# Patient Record
Sex: MALE | Race: WHITE | NOT HISPANIC OR LATINO | Employment: FULL TIME | URBAN - METROPOLITAN AREA
[De-identification: names, ages, dates, MRNs, and addresses within clinical notes are randomized per-mention and may not be internally consistent; named-entity substitution may affect disease eponyms.]

---

## 2019-03-29 ENCOUNTER — HOSPITAL ENCOUNTER (OUTPATIENT)
Dept: RADIOLOGY | Facility: MEDICAL CENTER | Age: 44
End: 2019-03-29
Attending: PHYSICIAN ASSISTANT
Payer: COMMERCIAL

## 2019-03-29 ENCOUNTER — HOSPITAL ENCOUNTER (EMERGENCY)
Facility: MEDICAL CENTER | Age: 44
End: 2019-03-29
Attending: EMERGENCY MEDICINE
Payer: COMMERCIAL

## 2019-03-29 VITALS
SYSTOLIC BLOOD PRESSURE: 169 MMHG | WEIGHT: 245.59 LBS | HEART RATE: 69 BPM | DIASTOLIC BLOOD PRESSURE: 91 MMHG | TEMPERATURE: 98.1 F | OXYGEN SATURATION: 98 % | HEIGHT: 74 IN | BODY MASS INDEX: 31.52 KG/M2 | RESPIRATION RATE: 20 BRPM

## 2019-03-29 DIAGNOSIS — I82.492 ACUTE DEEP VEIN THROMBOSIS (DVT) OF OTHER SPECIFIED VEIN OF LEFT LOWER EXTREMITY (HCC): ICD-10-CM

## 2019-03-29 DIAGNOSIS — M79.605 LEFT LEG PAIN: ICD-10-CM

## 2019-03-29 PROCEDURE — 93971 EXTREMITY STUDY: CPT | Mod: LT

## 2019-03-29 PROCEDURE — 99284 EMERGENCY DEPT VISIT MOD MDM: CPT

## 2019-03-29 PROCEDURE — 700102 HCHG RX REV CODE 250 W/ 637 OVERRIDE(OP): Performed by: EMERGENCY MEDICINE

## 2019-03-29 PROCEDURE — A9270 NON-COVERED ITEM OR SERVICE: HCPCS | Performed by: EMERGENCY MEDICINE

## 2019-03-29 RX ORDER — VERAPAMIL HYDROCHLORIDE 240 MG/1
240 TABLET, FILM COATED, EXTENDED RELEASE ORAL DAILY
COMMUNITY

## 2019-03-29 RX ADMIN — RIVAROXABAN 15 MG: 15 TABLET, FILM COATED ORAL at 21:23

## 2019-03-30 NOTE — DISCHARGE INSTRUCTIONS
He was seen in the emergency department for a blood clot in your leg.  This is concerning for a deep venous thrombosis.  You are being started on anticoagulation for this.  Please apply hot compresses to the area of the swelling to help your body resorb it.    You are being started on a blood thinner.  You must take this with meals for to be effective.  You will take it twice a day for 21 days, then will be transitioned onto a higher dose that is daily.  Please follow-up with your primary care provider for this ongoing prescription.  You will likely be on this medication for many months.  Please take care to avoid significant accidents while you are taking this medication.    Please return to the emergency department or seek medical attention if you develop:  Any significant head trauma, dark tarry stools, bloody stools, blood in the urine, chest pain, shortness of breath, feeling like you are going to pass out, any other new or concerning findings

## 2019-03-30 NOTE — ED PROVIDER NOTES
"ED Provider Note      Means of Arrival: Private vehicle  History obtained from: Patient, medical record      CHIEF COMPLAINT  Chief Complaint   Patient presents with   • Sent by MD KATZ  Colt Segovia is a 43 y.o. male who presents with blood clot in his left leg.  The patient noted this morning that he had swelling and redness on the inside of his left thigh.  He has a history of varicose veins.  He denies any chest pain, shortness of breath, prior DVT, recent long travels, recent surgeries, abnormal bleeding.  An ultrasound was done, which demonstrated a clot in the greater saphenous vein to the junction.     REVIEW OF SYSTEMS  CONSTITUTIONAL:  No fever.  CARDIOVASCULAR:  No chest discomfort.  RESPIRATORY:  No pleuritic chest pain.  GASTROINTESTINAL:  No abdominal pain.  GENITOURINARY:   No dysuria.  MUSCULOSKELETAL:  No arthralgia.  SKIN:  No rash or suspicious lesions.  NEUROLOGIC:   No headache.  ENDOCRINE:  No facial edema.  HEMATOLOGIC:  No abnormal bleeding.       See HPI for further details.   All other systems are negative.     PAST MEDICAL HISTORY  Past Medical History:   Diagnosis Date   • Ventricular tachycardia (HCC)        FAMILY HISTORY  History reviewed. No pertinent family history.    SOCIAL HISTORY   reports that he has never smoked. His smokeless tobacco use includes Chew. He reports that he does not drink alcohol or use drugs.    SURGICAL HISTORY  History reviewed. No pertinent surgical history.    CURRENT MEDICATIONS  Home Medications     Reviewed by Bautista Jewell R.N. (Registered Nurse) on 03/29/19 at 1834  Med List Status: Partial   Medication Last Dose Status   verapamil ER (CALAN-SR) 240 MG Tab CR  Active                ALLERGIES  No Known Allergies    PHYSICAL EXAM  VITAL SIGNS: BP (!) 187/122   Pulse 69   Temp 36.9 °C (98.5 °F) (Temporal)   Resp 18   Ht 1.88 m (6' 2\")   Wt 111.4 kg (245 lb 9.5 oz)   SpO2 96%   BMI 31.53 kg/m²    Gen: Alert  HENT: ATNC  Eyes: Normal " conjunctiva  Neck: trachea midline  Resp: no respiratory distress, clear to auscultation bilaterally  CV: No JVD, regular rate and rhythm, no murmurs, rubs, gallops  Abd: non-distended  Ext: No deformities.  Palpable thrill along medial aspect of thigh with surrounding erythema and warmth.  No significant tenderness to palpation.  Normal pulses, no distal swelling.  Psych: normal mood  Neuro: speech fluent           COURSE & MEDICAL DECISION MAKING  Pertinent Labs & Imaging studies reviewed. (See chart for details)    Patient has clotted greater saphenous vein, however this extends to the junction concerning for possible DVT.  He will be started on anticoagulant agent for this.  He has no signs or symptoms to suggest pulmonary embolism, specifically no tachycardia, hypoxia, chest pain, shortness of breath, lightheadedness.  Patient will be started on Xarelto.  The risks and benefits of this medication were discussed with the patient expressed understanding.  He was provided the opportunity ask questions and given return precautions.  No phlegmasia cerulea dolens.    FINAL IMPRESSION  1. Acute deep vein thrombosis (DVT) of other specified vein of left lower extremity (HCC)

## 2019-03-30 NOTE — ED TRIAGE NOTES
Pt bib self with after being sent by his PCP. Pt underwent an US of his left leg earlier today at which point a clot was found in his upper thigh. Pt was advised to be seen in the ED for further evaluation

## 2023-01-24 ENCOUNTER — APPOINTMENT (OUTPATIENT)
Dept: URGENT CARE | Facility: CLINIC | Age: 48
End: 2023-01-24

## 2023-01-24 ENCOUNTER — OFFICE VISIT (OUTPATIENT)
Dept: URGENT CARE | Facility: CLINIC | Age: 48
End: 2023-01-24
Payer: COMMERCIAL

## 2023-01-24 VITALS
BODY MASS INDEX: 29.52 KG/M2 | HEIGHT: 74 IN | RESPIRATION RATE: 16 BRPM | SYSTOLIC BLOOD PRESSURE: 160 MMHG | WEIGHT: 230 LBS | HEART RATE: 96 BPM | TEMPERATURE: 98.9 F | OXYGEN SATURATION: 97 % | DIASTOLIC BLOOD PRESSURE: 100 MMHG

## 2023-01-24 DIAGNOSIS — J06.9 VIRAL URI WITH COUGH: ICD-10-CM

## 2023-01-24 DIAGNOSIS — U07.1 COVID-19: ICD-10-CM

## 2023-01-24 PROCEDURE — 99203 OFFICE O/P NEW LOW 30 MIN: CPT | Performed by: NURSE PRACTITIONER

## 2023-01-24 RX ORDER — ESCITALOPRAM OXALATE 20 MG/1
TABLET ORAL
COMMUNITY
Start: 2023-01-03

## 2023-01-24 RX ORDER — LISINOPRIL 20 MG/1
TABLET ORAL
COMMUNITY
Start: 2022-12-28

## 2023-01-24 ASSESSMENT — ENCOUNTER SYMPTOMS
COUGH: 1
SHORTNESS OF BREATH: 0
NAUSEA: 0
WHEEZING: 0
ABDOMINAL PAIN: 0
CARDIOVASCULAR NEGATIVE: 1
FEVER: 0
VOMITING: 1

## 2023-01-24 ASSESSMENT — VISUAL ACUITY: OU: 1

## 2023-01-24 NOTE — PROGRESS NOTES
"Subjective:     Colt Segovia is a 47 y.o. male who presents for Emesis (\"Tested positive for Covid one week ago. I start coughing so hard that I vomit.\" )       URI   This is a new problem. The problem has been gradually worsening. Associated symptoms include coughing (Dry) and vomiting. Pertinent negatives include no abdominal pain, chest pain, nausea or wheezing.     Last Monday, patient tested positive for COVID.  Reports symptoms started last Saturday.  Felt fatigued.  Improving over all, but continues to have intermittent dry coughing fits.  Would sometimes cause him to vomit.  Reports he is only here because his wife was concerned about his bad cough last night.  Tx: ice water, Advil PM.    Review of Systems   Constitutional:  Positive for malaise/fatigue. Negative for fever.   Respiratory:  Positive for cough (Dry). Negative for shortness of breath and wheezing.    Cardiovascular: Negative.  Negative for chest pain.   Gastrointestinal:  Positive for vomiting. Negative for abdominal pain and nausea.   All other systems reviewed and are negative.    Refer to HPI for additional details.    During this visit, appropriate PPE was worn, hand hygiene was performed, and the patient and any visitors were masked.    PMH:  has a past medical history of Ventricular tachycardia.    MEDS:   Current Outpatient Medications:     escitalopram (LEXAPRO) 20 MG tablet, TAKE 1 TABLET BY MOUTH EVERY DAY-NEEDS APPT FOR REFILLS, Disp: , Rfl:     lisinopril (PRINIVIL) 20 MG Tab, TAKE 1 TABLET BY MOUTH EVERY DAY-NEEDS APPT FOR REFILLS, Disp: , Rfl:     verapamil ER (CALAN-SR) 240 MG Tab CR, Take 240 mg by mouth every day., Disp: , Rfl:     ALLERGIES: No Known Allergies  SURGHX: History reviewed. No pertinent surgical history.  SOCHX:  reports that he has never smoked. His smokeless tobacco use includes chew. He reports that he does not drink alcohol and does not use drugs.    FH: Per HPI as " "applicable/pertinent.      Objective:     BP (!) 160/100 (BP Location: Left arm, Patient Position: Sitting, BP Cuff Size: Adult)   Pulse 96   Temp 37.2 °C (98.9 °F) (Temporal)   Resp 16   Ht 1.88 m (6' 2\")   Wt 104 kg (230 lb)   SpO2 97%   BMI 29.53 kg/m²     Physical Exam  Nursing note reviewed.   Constitutional:       General: He is not in acute distress.     Appearance: He is well-developed. He is not ill-appearing or toxic-appearing.   Eyes:      General: Vision grossly intact.      Extraocular Movements: Extraocular movements intact.   Cardiovascular:      Rate and Rhythm: Normal rate and regular rhythm.      Heart sounds: Normal heart sounds.   Pulmonary:      Effort: Pulmonary effort is normal. No respiratory distress.      Breath sounds: Normal breath sounds. No stridor. No decreased breath sounds, wheezing, rhonchi or rales.   Musculoskeletal:         General: No deformity. Normal range of motion.      Cervical back: Normal range of motion.   Skin:     General: Skin is warm and dry.      Coloration: Skin is not pale.   Neurological:      Mental Status: He is alert and oriented to person, place, and time.      Motor: No weakness.   Psychiatric:         Behavior: Behavior normal. Behavior is cooperative.       Assessment/Plan:     1. Viral URI with cough    2. COVID-19    Discussed likely self-limiting viral etiology and expected course and duration of illness. Vital signs stable, afebrile, no acute distress at this time.    Emphasize continued supportive measures, monitoring. Offered Rx for cough and albuterol inhaler to help with possible bronchospasm. Patient declines. He will use OTC lozenges. Advised he may also use OTC cough medication.    Warning signs reviewed. Return precautions discussed.     Differential diagnosis, natural history, supportive care, over-the-counter symptom management per 's instructions, close monitoring, and indications for immediate follow-up discussed as " patient started to walk out the door seemingly in a hurry.

## 2023-10-15 ENCOUNTER — APPOINTMENT (OUTPATIENT)
Dept: RADIOLOGY | Facility: MEDICAL CENTER | Age: 48
DRG: 894 | End: 2023-10-15
Attending: EMERGENCY MEDICINE
Payer: COMMERCIAL

## 2023-10-15 ENCOUNTER — HOSPITAL ENCOUNTER (INPATIENT)
Facility: MEDICAL CENTER | Age: 48
LOS: 9 days | DRG: 894 | End: 2023-10-25
Attending: EMERGENCY MEDICINE | Admitting: INTERNAL MEDICINE
Payer: COMMERCIAL

## 2023-10-15 DIAGNOSIS — T14.91XA SUICIDE ATTEMPT (HCC): ICD-10-CM

## 2023-10-15 DIAGNOSIS — F10.920 ALCOHOLIC INTOXICATION WITHOUT COMPLICATION (HCC): ICD-10-CM

## 2023-10-15 DIAGNOSIS — S22.089A CLOSED FRACTURE OF ELEVENTH THORACIC VERTEBRA, UNSPECIFIED FRACTURE MORPHOLOGY, INITIAL ENCOUNTER (HCC): ICD-10-CM

## 2023-10-15 LAB
ABO + RH BLD: NORMAL
ABO GROUP BLD: NORMAL
ALBUMIN SERPL BCP-MCNC: 4.5 G/DL (ref 3.2–4.9)
ALBUMIN/GLOB SERPL: 1.5 G/DL
ALP SERPL-CCNC: 75 U/L (ref 30–99)
ALT SERPL-CCNC: 47 U/L (ref 2–50)
AMPHET UR QL SCN: NEGATIVE
ANION GAP SERPL CALC-SCNC: 14 MMOL/L (ref 7–16)
APTT PPP: <20 SEC (ref 24.7–36)
AST SERPL-CCNC: 64 U/L (ref 12–45)
BARBITURATES UR QL SCN: NEGATIVE
BENZODIAZ UR QL SCN: POSITIVE
BILIRUB SERPL-MCNC: 0.8 MG/DL (ref 0.1–1.5)
BLD GP AB SCN SERPL QL: NORMAL
BUN SERPL-MCNC: 11 MG/DL (ref 8–22)
BZE UR QL SCN: NEGATIVE
CALCIUM ALBUM COR SERPL-MCNC: 8.7 MG/DL (ref 8.5–10.5)
CALCIUM SERPL-MCNC: 9.1 MG/DL (ref 8.5–10.5)
CANNABINOIDS UR QL SCN: NEGATIVE
CHLORIDE SERPL-SCNC: 103 MMOL/L (ref 96–112)
CO2 SERPL-SCNC: 24 MMOL/L (ref 20–33)
CREAT SERPL-MCNC: 1.17 MG/DL (ref 0.5–1.4)
ERYTHROCYTE [DISTWIDTH] IN BLOOD BY AUTOMATED COUNT: 46 FL (ref 35.9–50)
ETHANOL BLD-MCNC: 406.4 MG/DL
FENTANYL UR QL: NEGATIVE
GFR SERPLBLD CREATININE-BSD FMLA CKD-EPI: 77 ML/MIN/1.73 M 2
GLOBULIN SER CALC-MCNC: 3 G/DL (ref 1.9–3.5)
GLUCOSE SERPL-MCNC: 132 MG/DL (ref 65–99)
HCT VFR BLD AUTO: 48.2 % (ref 42–52)
HGB BLD-MCNC: 16.1 G/DL (ref 14–18)
INR PPP: 0.97 (ref 0.87–1.13)
MCH RBC QN AUTO: 34.5 PG (ref 27–33)
MCHC RBC AUTO-ENTMCNC: 33.4 G/DL (ref 32.3–36.5)
MCV RBC AUTO: 103.2 FL (ref 81.4–97.8)
METHADONE UR QL SCN: NEGATIVE
OPIATES UR QL SCN: NEGATIVE
OXYCODONE UR QL SCN: NEGATIVE
PCP UR QL SCN: NEGATIVE
PLATELET # BLD AUTO: 210 K/UL (ref 164–446)
PMV BLD AUTO: 9.9 FL (ref 9–12.9)
POC BREATHALIZER: 0.16 PERCENT (ref 0–0.01)
POTASSIUM SERPL-SCNC: 3.7 MMOL/L (ref 3.6–5.5)
PROPOXYPH UR QL SCN: NEGATIVE
PROT SERPL-MCNC: 7.5 G/DL (ref 6–8.2)
PROTHROMBIN TIME: 13 SEC (ref 12–14.6)
RBC # BLD AUTO: 4.67 M/UL (ref 4.7–6.1)
RH BLD: NORMAL
SODIUM SERPL-SCNC: 141 MMOL/L (ref 135–145)
WBC # BLD AUTO: 6.5 K/UL (ref 4.8–10.8)

## 2023-10-15 PROCEDURE — 700105 HCHG RX REV CODE 258: Performed by: EMERGENCY MEDICINE

## 2023-10-15 PROCEDURE — 86901 BLOOD TYPING SEROLOGIC RH(D): CPT

## 2023-10-15 PROCEDURE — 85730 THROMBOPLASTIN TIME PARTIAL: CPT

## 2023-10-15 PROCEDURE — 700111 HCHG RX REV CODE 636 W/ 250 OVERRIDE (IP)

## 2023-10-15 PROCEDURE — 700117 HCHG RX CONTRAST REV CODE 255: Performed by: EMERGENCY MEDICINE

## 2023-10-15 PROCEDURE — 99285 EMERGENCY DEPT VISIT HI MDM: CPT

## 2023-10-15 PROCEDURE — 302970 POC BREATHALIZER: Performed by: STUDENT IN AN ORGANIZED HEALTH CARE EDUCATION/TRAINING PROGRAM

## 2023-10-15 PROCEDURE — 36415 COLL VENOUS BLD VENIPUNCTURE: CPT

## 2023-10-15 PROCEDURE — 73590 X-RAY EXAM OF LOWER LEG: CPT | Mod: RT

## 2023-10-15 PROCEDURE — 72131 CT LUMBAR SPINE W/O DYE: CPT

## 2023-10-15 PROCEDURE — 96375 TX/PRO/DX INJ NEW DRUG ADDON: CPT

## 2023-10-15 PROCEDURE — 70486 CT MAXILLOFACIAL W/O DYE: CPT

## 2023-10-15 PROCEDURE — 80053 COMPREHEN METABOLIC PANEL: CPT

## 2023-10-15 PROCEDURE — 305948 HCHG GREEN TRAUMA ACT PRE-NOTIFY NO CC

## 2023-10-15 PROCEDURE — 72125 CT NECK SPINE W/O DYE: CPT

## 2023-10-15 PROCEDURE — 80307 DRUG TEST PRSMV CHEM ANLYZR: CPT

## 2023-10-15 PROCEDURE — 71260 CT THORAX DX C+: CPT

## 2023-10-15 PROCEDURE — 72170 X-RAY EXAM OF PELVIS: CPT

## 2023-10-15 PROCEDURE — 96366 THER/PROPH/DIAG IV INF ADDON: CPT

## 2023-10-15 PROCEDURE — 73600 X-RAY EXAM OF ANKLE: CPT | Mod: RT

## 2023-10-15 PROCEDURE — 85027 COMPLETE CBC AUTOMATED: CPT

## 2023-10-15 PROCEDURE — 70450 CT HEAD/BRAIN W/O DYE: CPT

## 2023-10-15 PROCEDURE — 96365 THER/PROPH/DIAG IV INF INIT: CPT

## 2023-10-15 PROCEDURE — 86900 BLOOD TYPING SEROLOGIC ABO: CPT

## 2023-10-15 PROCEDURE — 700101 HCHG RX REV CODE 250: Performed by: EMERGENCY MEDICINE

## 2023-10-15 PROCEDURE — 85610 PROTHROMBIN TIME: CPT

## 2023-10-15 PROCEDURE — 700111 HCHG RX REV CODE 636 W/ 250 OVERRIDE (IP): Performed by: EMERGENCY MEDICINE

## 2023-10-15 PROCEDURE — 96368 THER/DIAG CONCURRENT INF: CPT

## 2023-10-15 PROCEDURE — 71045 X-RAY EXAM CHEST 1 VIEW: CPT

## 2023-10-15 PROCEDURE — 82077 ASSAY SPEC XCP UR&BREATH IA: CPT

## 2023-10-15 PROCEDURE — 86850 RBC ANTIBODY SCREEN: CPT

## 2023-10-15 PROCEDURE — 72128 CT CHEST SPINE W/O DYE: CPT

## 2023-10-15 PROCEDURE — 93005 ELECTROCARDIOGRAM TRACING: CPT | Performed by: EMERGENCY MEDICINE

## 2023-10-15 RX ORDER — LORAZEPAM 2 MG/ML
0.5 INJECTION INTRAMUSCULAR EVERY 4 HOURS PRN
Status: DISCONTINUED | OUTPATIENT
Start: 2023-10-15 | End: 2023-10-16

## 2023-10-15 RX ORDER — LORAZEPAM 2 MG/ML
2 INJECTION INTRAMUSCULAR ONCE
Status: COMPLETED | OUTPATIENT
Start: 2023-10-15 | End: 2023-10-15

## 2023-10-15 RX ORDER — LORAZEPAM 2 MG/1
2 TABLET ORAL
Status: DISCONTINUED | OUTPATIENT
Start: 2023-10-15 | End: 2023-10-16

## 2023-10-15 RX ORDER — LORAZEPAM 2 MG/ML
INJECTION INTRAMUSCULAR
Status: COMPLETED
Start: 2023-10-15 | End: 2023-10-15

## 2023-10-15 RX ORDER — LORAZEPAM 2 MG/ML
1 INJECTION INTRAMUSCULAR
Status: DISCONTINUED | OUTPATIENT
Start: 2023-10-15 | End: 2023-10-16

## 2023-10-15 RX ORDER — LORAZEPAM 2 MG/ML
2 INJECTION INTRAMUSCULAR
Status: DISCONTINUED | OUTPATIENT
Start: 2023-10-15 | End: 2023-10-16

## 2023-10-15 RX ORDER — SODIUM CHLORIDE, SODIUM LACTATE, POTASSIUM CHLORIDE, CALCIUM CHLORIDE 600; 310; 30; 20 MG/100ML; MG/100ML; MG/100ML; MG/100ML
1000 INJECTION, SOLUTION INTRAVENOUS ONCE
Status: COMPLETED | OUTPATIENT
Start: 2023-10-15 | End: 2023-10-15

## 2023-10-15 RX ORDER — LORAZEPAM 0.5 MG/1
0.5 TABLET ORAL EVERY 4 HOURS PRN
Status: DISCONTINUED | OUTPATIENT
Start: 2023-10-15 | End: 2023-10-17

## 2023-10-15 RX ORDER — LORAZEPAM 1 MG/1
1 TABLET ORAL EVERY 4 HOURS PRN
Status: DISCONTINUED | OUTPATIENT
Start: 2023-10-15 | End: 2023-10-16

## 2023-10-15 RX ORDER — LORAZEPAM 2 MG/ML
1.5 INJECTION INTRAMUSCULAR
Status: DISCONTINUED | OUTPATIENT
Start: 2023-10-15 | End: 2023-10-16

## 2023-10-15 RX ORDER — HALOPERIDOL 5 MG/ML
5 INJECTION INTRAMUSCULAR ONCE
Status: COMPLETED | OUTPATIENT
Start: 2023-10-15 | End: 2023-10-15

## 2023-10-15 RX ORDER — LORAZEPAM 2 MG/1
4 TABLET ORAL
Status: DISCONTINUED | OUTPATIENT
Start: 2023-10-15 | End: 2023-10-16

## 2023-10-15 RX ORDER — DIPHENHYDRAMINE HYDROCHLORIDE 50 MG/ML
50 INJECTION INTRAMUSCULAR; INTRAVENOUS ONCE
Status: COMPLETED | OUTPATIENT
Start: 2023-10-15 | End: 2023-10-15

## 2023-10-15 RX ADMIN — LORAZEPAM 2 MG: 2 INJECTION INTRAMUSCULAR; INTRAVENOUS at 17:15

## 2023-10-15 RX ADMIN — SODIUM CHLORIDE, POTASSIUM CHLORIDE, SODIUM LACTATE AND CALCIUM CHLORIDE 1000 ML: 600; 310; 30; 20 INJECTION, SOLUTION INTRAVENOUS at 17:22

## 2023-10-15 RX ADMIN — LORAZEPAM 2 MG: 2 INJECTION INTRAMUSCULAR at 17:15

## 2023-10-15 RX ADMIN — IOHEXOL 100 ML: 350 INJECTION, SOLUTION INTRAVENOUS at 17:45

## 2023-10-15 RX ADMIN — THIAMINE HYDROCHLORIDE 500 MG: 100 INJECTION, SOLUTION INTRAMUSCULAR; INTRAVENOUS at 18:00

## 2023-10-15 RX ADMIN — LORAZEPAM 1.5 MG: 2 INJECTION INTRAMUSCULAR; INTRAVENOUS at 18:31

## 2023-10-15 RX ADMIN — FOLIC ACID 1000 ML: 5 INJECTION, SOLUTION INTRAMUSCULAR; INTRAVENOUS; SUBCUTANEOUS at 17:41

## 2023-10-15 RX ADMIN — DIPHENHYDRAMINE HYDROCHLORIDE 50 MG: 50 INJECTION, SOLUTION INTRAMUSCULAR; INTRAVENOUS at 17:41

## 2023-10-15 RX ADMIN — HALOPERIDOL LACTATE 5 MG: 5 INJECTION, SOLUTION INTRAMUSCULAR at 17:19

## 2023-10-15 ASSESSMENT — LIFESTYLE VARIABLES
AUDITORY DISTURBANCES: NOT PRESENT
TOTAL SCORE: 19
HEADACHE, FULLNESS IN HEAD: NOT PRESENT
VISUAL DISTURBANCES: NOT PRESENT
PAROXYSMAL SWEATS: BEADS OF SWEAT OBVIOUS ON FOREHEAD
AGITATION: MODERATELY FIDGETY AND RESTLESS
PAROXYSMAL SWEATS: NO SWEAT VISIBLE
ANXIETY: NO ANXIETY (AT EASE)
TOTAL SCORE: 0
AUDITORY DISTURBANCES: NOT PRESENT
AGITATION: NORMAL ACTIVITY
ORIENTATION AND CLOUDING OF SENSORIUM: ORIENTED AND CAN DO SERIAL ADDITIONS
NAUSEA AND VOMITING: NO NAUSEA AND NO VOMITING
ANXIETY: MODERATELY ANXIOUS OR GUARDED, SO ANXIETY IS INFERRED
NAUSEA AND VOMITING: NO NAUSEA AND NO VOMITING
HEADACHE, FULLNESS IN HEAD: MILD
ORIENTATION AND CLOUDING OF SENSORIUM: DISORIENTED FOR PLACE AND / OR PERSON
TREMOR: NO TREMOR
VISUAL DISTURBANCES: NOT PRESENT
TREMOR: TREMOR NOT VISIBLE BUT CAN BE FELT, FINGERTIP TO FINGERTIP

## 2023-10-15 ASSESSMENT — PAIN SCALES - PAIN ASSESSMENT IN ADVANCED DEMENTIA (PAINAD)
BREATHING: NORMAL
BODYLANGUAGE: RELAXED
CONSOLABILITY: NO NEED TO CONSOLE
TOTALSCORE: 0
FACIALEXPRESSION: SMILING OR INEXPRESSIVE

## 2023-10-15 ASSESSMENT — PAIN SCALES - WONG BAKER: WONGBAKER_NUMERICALRESPONSE: HURTS A LITTLE MORE

## 2023-10-15 ASSESSMENT — PAIN DESCRIPTION - PAIN TYPE: TYPE: ACUTE PAIN

## 2023-10-16 ENCOUNTER — APPOINTMENT (OUTPATIENT)
Dept: RADIOLOGY | Facility: MEDICAL CENTER | Age: 48
DRG: 894 | End: 2023-10-16
Attending: INTERNAL MEDICINE
Payer: COMMERCIAL

## 2023-10-16 PROBLEM — I10 HTN (HYPERTENSION): Status: ACTIVE | Noted: 2023-10-16

## 2023-10-16 PROBLEM — E04.2 MULTIPLE THYROID NODULES: Status: ACTIVE | Noted: 2023-10-16

## 2023-10-16 PROBLEM — S22.089A CLOSED T11 FRACTURE (HCC): Status: ACTIVE | Noted: 2023-10-16

## 2023-10-16 PROBLEM — R45.851 SUICIDAL IDEATION: Status: ACTIVE | Noted: 2023-10-16

## 2023-10-16 PROBLEM — I25.10 CAD (CORONARY ARTERY DISEASE): Status: ACTIVE | Noted: 2023-10-16

## 2023-10-16 PROBLEM — F10.10 ALCOHOL ABUSE: Status: ACTIVE | Noted: 2023-10-16

## 2023-10-16 PROBLEM — F10.931 ALCOHOL WITHDRAWAL DELIRIUM, PERSISTENT, HYPERACTIVE (HCC): Status: ACTIVE | Noted: 2023-10-16

## 2023-10-16 PROBLEM — D75.89 MACROCYTOSIS: Status: ACTIVE | Noted: 2023-10-16

## 2023-10-16 PROBLEM — R60.0 EDEMA OF RIGHT LOWER EXTREMITY: Status: ACTIVE | Noted: 2023-10-16

## 2023-10-16 LAB
ALBUMIN SERPL BCP-MCNC: 4.1 G/DL (ref 3.2–4.9)
ALBUMIN/GLOB SERPL: 1.4 G/DL
ALP SERPL-CCNC: 67 U/L (ref 30–99)
ALT SERPL-CCNC: 41 U/L (ref 2–50)
ANION GAP SERPL CALC-SCNC: 15 MMOL/L (ref 7–16)
AST SERPL-CCNC: 59 U/L (ref 12–45)
BASOPHILS # BLD AUTO: 0.6 % (ref 0–1.8)
BASOPHILS # BLD: 0.05 K/UL (ref 0–0.12)
BILIRUB SERPL-MCNC: 1.2 MG/DL (ref 0.1–1.5)
BUN SERPL-MCNC: 7 MG/DL (ref 8–22)
CALCIUM ALBUM COR SERPL-MCNC: 8.6 MG/DL (ref 8.5–10.5)
CALCIUM SERPL-MCNC: 8.7 MG/DL (ref 8.5–10.5)
CHLORIDE SERPL-SCNC: 106 MMOL/L (ref 96–112)
CO2 SERPL-SCNC: 22 MMOL/L (ref 20–33)
CREAT SERPL-MCNC: 0.87 MG/DL (ref 0.5–1.4)
EKG IMPRESSION: NORMAL
EOSINOPHIL # BLD AUTO: 0.03 K/UL (ref 0–0.51)
EOSINOPHIL NFR BLD: 0.4 % (ref 0–6.9)
ERYTHROCYTE [DISTWIDTH] IN BLOOD BY AUTOMATED COUNT: 46.7 FL (ref 35.9–50)
GFR SERPLBLD CREATININE-BSD FMLA CKD-EPI: 106 ML/MIN/1.73 M 2
GLOBULIN SER CALC-MCNC: 3 G/DL (ref 1.9–3.5)
GLUCOSE SERPL-MCNC: 112 MG/DL (ref 65–99)
HCT VFR BLD AUTO: 46 % (ref 42–52)
HGB BLD-MCNC: 15.4 G/DL (ref 14–18)
IMM GRANULOCYTES # BLD AUTO: 0.03 K/UL (ref 0–0.11)
IMM GRANULOCYTES NFR BLD AUTO: 0.4 % (ref 0–0.9)
LYMPHOCYTES # BLD AUTO: 1.66 K/UL (ref 1–4.8)
LYMPHOCYTES NFR BLD: 20.9 % (ref 22–41)
MCH RBC QN AUTO: 34.8 PG (ref 27–33)
MCHC RBC AUTO-ENTMCNC: 33.5 G/DL (ref 32.3–36.5)
MCV RBC AUTO: 104.1 FL (ref 81.4–97.8)
MONOCYTES # BLD AUTO: 0.67 K/UL (ref 0–0.85)
MONOCYTES NFR BLD AUTO: 8.4 % (ref 0–13.4)
NEUTROPHILS # BLD AUTO: 5.49 K/UL (ref 1.82–7.42)
NEUTROPHILS NFR BLD: 69.3 % (ref 44–72)
NRBC # BLD AUTO: 0 K/UL
NRBC BLD-RTO: 0 /100 WBC (ref 0–0.2)
PLATELET # BLD AUTO: 169 K/UL (ref 164–446)
PMV BLD AUTO: 10 FL (ref 9–12.9)
POC BREATHALIZER: 0 PERCENT (ref 0–0.01)
POC BREATHALIZER: 0.09 PERCENT (ref 0–0.01)
POC BREATHALIZER: 0.15 PERCENT (ref 0–0.01)
POTASSIUM SERPL-SCNC: 3.8 MMOL/L (ref 3.6–5.5)
PROT SERPL-MCNC: 7.1 G/DL (ref 6–8.2)
RBC # BLD AUTO: 4.42 M/UL (ref 4.7–6.1)
SODIUM SERPL-SCNC: 143 MMOL/L (ref 135–145)
T4 FREE SERPL-MCNC: 0.94 NG/DL (ref 0.93–1.7)
TSH SERPL DL<=0.005 MIU/L-ACNC: 5.5 UIU/ML (ref 0.38–5.33)
VIT B12 SERPL-MCNC: 353 PG/ML (ref 211–911)
WBC # BLD AUTO: 7.9 K/UL (ref 4.8–10.8)

## 2023-10-16 PROCEDURE — 93971 EXTREMITY STUDY: CPT | Mod: 26,RT | Performed by: INTERNAL MEDICINE

## 2023-10-16 PROCEDURE — 93971 EXTREMITY STUDY: CPT | Mod: RT

## 2023-10-16 PROCEDURE — 700111 HCHG RX REV CODE 636 W/ 250 OVERRIDE (IP): Performed by: EMERGENCY MEDICINE

## 2023-10-16 PROCEDURE — 84439 ASSAY OF FREE THYROXINE: CPT

## 2023-10-16 PROCEDURE — 36415 COLL VENOUS BLD VENIPUNCTURE: CPT

## 2023-10-16 PROCEDURE — 700105 HCHG RX REV CODE 258: Performed by: STUDENT IN AN ORGANIZED HEALTH CARE EDUCATION/TRAINING PROGRAM

## 2023-10-16 PROCEDURE — 700105 HCHG RX REV CODE 258: Performed by: EMERGENCY MEDICINE

## 2023-10-16 PROCEDURE — 90791 PSYCH DIAGNOSTIC EVALUATION: CPT

## 2023-10-16 PROCEDURE — A9270 NON-COVERED ITEM OR SERVICE: HCPCS | Performed by: EMERGENCY MEDICINE

## 2023-10-16 PROCEDURE — 700102 HCHG RX REV CODE 250 W/ 637 OVERRIDE(OP): Mod: JZ | Performed by: INTERNAL MEDICINE

## 2023-10-16 PROCEDURE — 99223 1ST HOSP IP/OBS HIGH 75: CPT | Performed by: INTERNAL MEDICINE

## 2023-10-16 PROCEDURE — 84443 ASSAY THYROID STIM HORMONE: CPT

## 2023-10-16 PROCEDURE — 96376 TX/PRO/DX INJ SAME DRUG ADON: CPT

## 2023-10-16 PROCEDURE — 84425 ASSAY OF VITAMIN B-1: CPT

## 2023-10-16 PROCEDURE — 85025 COMPLETE CBC W/AUTO DIFF WBC: CPT

## 2023-10-16 PROCEDURE — 700105 HCHG RX REV CODE 258: Performed by: INTERNAL MEDICINE

## 2023-10-16 PROCEDURE — 96367 TX/PROPH/DG ADDL SEQ IV INF: CPT

## 2023-10-16 PROCEDURE — 700111 HCHG RX REV CODE 636 W/ 250 OVERRIDE (IP): Mod: JZ | Performed by: INTERNAL MEDICINE

## 2023-10-16 PROCEDURE — 302970 POC BREATHALIZER

## 2023-10-16 PROCEDURE — 99222 1ST HOSP IP/OBS MODERATE 55: CPT | Performed by: PSYCHIATRY & NEUROLOGY

## 2023-10-16 PROCEDURE — 80053 COMPREHEN METABOLIC PANEL: CPT

## 2023-10-16 PROCEDURE — 96375 TX/PRO/DX INJ NEW DRUG ADDON: CPT

## 2023-10-16 PROCEDURE — HZ2ZZZZ DETOXIFICATION SERVICES FOR SUBSTANCE ABUSE TREATMENT: ICD-10-PCS | Performed by: INTERNAL MEDICINE

## 2023-10-16 PROCEDURE — A9270 NON-COVERED ITEM OR SERVICE: HCPCS | Mod: JZ | Performed by: INTERNAL MEDICINE

## 2023-10-16 PROCEDURE — 700111 HCHG RX REV CODE 636 W/ 250 OVERRIDE (IP): Performed by: NURSE PRACTITIONER

## 2023-10-16 PROCEDURE — 770020 HCHG ROOM/CARE - TELE (206)

## 2023-10-16 PROCEDURE — 700102 HCHG RX REV CODE 250 W/ 637 OVERRIDE(OP): Performed by: EMERGENCY MEDICINE

## 2023-10-16 PROCEDURE — 82607 VITAMIN B-12: CPT

## 2023-10-16 PROCEDURE — 302970 POC BREATHALIZER: Performed by: STUDENT IN AN ORGANIZED HEALTH CARE EDUCATION/TRAINING PROGRAM

## 2023-10-16 RX ORDER — ENOXAPARIN SODIUM 100 MG/ML
40 INJECTION SUBCUTANEOUS DAILY
Status: DISCONTINUED | OUTPATIENT
Start: 2023-10-16 | End: 2023-10-19

## 2023-10-16 RX ORDER — LORAZEPAM 2 MG/ML
2 INJECTION INTRAMUSCULAR
Status: DISCONTINUED | OUTPATIENT
Start: 2023-10-16 | End: 2023-10-17

## 2023-10-16 RX ORDER — DIAZEPAM 5 MG/1
10 TABLET ORAL EVERY 6 HOURS
Status: COMPLETED | OUTPATIENT
Start: 2023-10-16 | End: 2023-10-17

## 2023-10-16 RX ORDER — ONDANSETRON 4 MG/1
4 TABLET, ORALLY DISINTEGRATING ORAL EVERY 4 HOURS PRN
Status: DISCONTINUED | OUTPATIENT
Start: 2023-10-16 | End: 2023-10-25 | Stop reason: HOSPADM

## 2023-10-16 RX ORDER — VERAPAMIL HYDROCHLORIDE 240 MG/1
240 TABLET, FILM COATED, EXTENDED RELEASE ORAL DAILY
COMMUNITY

## 2023-10-16 RX ORDER — KETOROLAC TROMETHAMINE 30 MG/ML
15 INJECTION, SOLUTION INTRAMUSCULAR; INTRAVENOUS ONCE
Status: COMPLETED | OUTPATIENT
Start: 2023-10-16 | End: 2023-10-16

## 2023-10-16 RX ORDER — FOLIC ACID 1 MG/1
1 TABLET ORAL DAILY
Status: COMPLETED | OUTPATIENT
Start: 2023-10-17 | End: 2023-10-20

## 2023-10-16 RX ORDER — LORAZEPAM 2 MG/ML
1.5 INJECTION INTRAMUSCULAR
Status: DISCONTINUED | OUTPATIENT
Start: 2023-10-16 | End: 2023-10-17

## 2023-10-16 RX ORDER — HALOPERIDOL 5 MG/ML
5 INJECTION INTRAMUSCULAR ONCE
Status: DISCONTINUED | OUTPATIENT
Start: 2023-10-16 | End: 2023-10-16

## 2023-10-16 RX ORDER — GAUZE BANDAGE 2" X 2"
100 BANDAGE TOPICAL DAILY
Status: DISCONTINUED | OUTPATIENT
Start: 2023-10-17 | End: 2023-10-17

## 2023-10-16 RX ORDER — LORAZEPAM 2 MG/ML
1 INJECTION INTRAMUSCULAR
Status: DISCONTINUED | OUTPATIENT
Start: 2023-10-16 | End: 2023-10-17

## 2023-10-16 RX ORDER — MAGNESIUM SULFATE HEPTAHYDRATE 40 MG/ML
2 INJECTION, SOLUTION INTRAVENOUS ONCE
Status: COMPLETED | OUTPATIENT
Start: 2023-10-16 | End: 2023-10-16

## 2023-10-16 RX ORDER — LABETALOL HYDROCHLORIDE 5 MG/ML
10 INJECTION, SOLUTION INTRAVENOUS EVERY 4 HOURS PRN
Status: DISCONTINUED | OUTPATIENT
Start: 2023-10-16 | End: 2023-10-17

## 2023-10-16 RX ORDER — BISACODYL 10 MG
10 SUPPOSITORY, RECTAL RECTAL
Status: DISCONTINUED | OUTPATIENT
Start: 2023-10-16 | End: 2023-10-17

## 2023-10-16 RX ORDER — LORAZEPAM 2 MG/ML
0.5 INJECTION INTRAMUSCULAR EVERY 4 HOURS PRN
Status: DISCONTINUED | OUTPATIENT
Start: 2023-10-16 | End: 2023-10-17

## 2023-10-16 RX ORDER — LORAZEPAM 1 MG/1
1 TABLET ORAL EVERY 4 HOURS PRN
Status: DISCONTINUED | OUTPATIENT
Start: 2023-10-16 | End: 2023-10-17

## 2023-10-16 RX ORDER — HALOPERIDOL 5 MG/ML
5 INJECTION INTRAMUSCULAR EVERY 4 HOURS PRN
Status: DISCONTINUED | OUTPATIENT
Start: 2023-10-16 | End: 2023-10-17

## 2023-10-16 RX ORDER — LORAZEPAM 2 MG/1
2 TABLET ORAL
Status: DISCONTINUED | OUTPATIENT
Start: 2023-10-16 | End: 2023-10-17

## 2023-10-16 RX ORDER — POLYETHYLENE GLYCOL 3350 17 G/17G
1 POWDER, FOR SOLUTION ORAL
Status: DISCONTINUED | OUTPATIENT
Start: 2023-10-16 | End: 2023-10-17

## 2023-10-16 RX ORDER — DIAZEPAM 5 MG/1
5 TABLET ORAL EVERY 6 HOURS
Status: DISCONTINUED | OUTPATIENT
Start: 2023-10-17 | End: 2023-10-17

## 2023-10-16 RX ORDER — SODIUM CHLORIDE 9 MG/ML
1000 INJECTION, SOLUTION INTRAVENOUS ONCE
Status: COMPLETED | OUTPATIENT
Start: 2023-10-16 | End: 2023-10-16

## 2023-10-16 RX ORDER — AMOXICILLIN 250 MG
2 CAPSULE ORAL 2 TIMES DAILY
Status: DISCONTINUED | OUTPATIENT
Start: 2023-10-16 | End: 2023-10-17

## 2023-10-16 RX ORDER — VERAPAMIL HYDROCHLORIDE 240 MG/1
240 TABLET, FILM COATED, EXTENDED RELEASE ORAL DAILY
Status: DISCONTINUED | OUTPATIENT
Start: 2023-10-16 | End: 2023-10-25 | Stop reason: HOSPADM

## 2023-10-16 RX ORDER — LORAZEPAM 2 MG/1
4 TABLET ORAL
Status: DISCONTINUED | OUTPATIENT
Start: 2023-10-16 | End: 2023-10-17

## 2023-10-16 RX ORDER — ESCITALOPRAM OXALATE 20 MG/1
20 TABLET ORAL DAILY
COMMUNITY

## 2023-10-16 RX ORDER — LORAZEPAM 1 MG/1
0.5 TABLET ORAL EVERY 4 HOURS PRN
Status: DISCONTINUED | OUTPATIENT
Start: 2023-10-16 | End: 2023-10-16

## 2023-10-16 RX ORDER — ACETAMINOPHEN 325 MG/1
650 TABLET ORAL EVERY 6 HOURS PRN
Status: DISCONTINUED | OUTPATIENT
Start: 2023-10-16 | End: 2023-10-25 | Stop reason: HOSPADM

## 2023-10-16 RX ORDER — LANOLIN ALCOHOL/MO/W.PET/CERES
400 CREAM (GRAM) TOPICAL 2 TIMES DAILY
Status: DISCONTINUED | OUTPATIENT
Start: 2023-10-17 | End: 2023-10-17

## 2023-10-16 RX ORDER — POTASSIUM CHLORIDE 20 MEQ/1
20 TABLET, EXTENDED RELEASE ORAL 2 TIMES DAILY
Status: DISCONTINUED | OUTPATIENT
Start: 2023-10-16 | End: 2023-10-17

## 2023-10-16 RX ORDER — IBUPROFEN 600 MG/1
600 TABLET ORAL EVERY 6 HOURS PRN
Status: DISCONTINUED | OUTPATIENT
Start: 2023-10-17 | End: 2023-10-17

## 2023-10-16 RX ORDER — SODIUM CHLORIDE 9 MG/ML
INJECTION, SOLUTION INTRAVENOUS ONCE
Status: COMPLETED | OUTPATIENT
Start: 2023-10-16 | End: 2023-10-16

## 2023-10-16 RX ORDER — SODIUM CHLORIDE, SODIUM LACTATE, POTASSIUM CHLORIDE, CALCIUM CHLORIDE 600; 310; 30; 20 MG/100ML; MG/100ML; MG/100ML; MG/100ML
INJECTION, SOLUTION INTRAVENOUS CONTINUOUS
Status: ACTIVE | OUTPATIENT
Start: 2023-10-16 | End: 2023-10-17

## 2023-10-16 RX ORDER — ONDANSETRON 2 MG/ML
4 INJECTION INTRAMUSCULAR; INTRAVENOUS EVERY 4 HOURS PRN
Status: DISCONTINUED | OUTPATIENT
Start: 2023-10-16 | End: 2023-10-25 | Stop reason: HOSPADM

## 2023-10-16 RX ADMIN — LORAZEPAM 1.5 MG: 2 INJECTION INTRAMUSCULAR; INTRAVENOUS at 20:56

## 2023-10-16 RX ADMIN — POTASSIUM CHLORIDE 20 MEQ: 1500 TABLET, EXTENDED RELEASE ORAL at 17:37

## 2023-10-16 RX ADMIN — SODIUM CHLORIDE, POTASSIUM CHLORIDE, SODIUM LACTATE AND CALCIUM CHLORIDE: 600; 310; 30; 20 INJECTION, SOLUTION INTRAVENOUS at 23:01

## 2023-10-16 RX ADMIN — LORAZEPAM 1.5 MG: 2 INJECTION INTRAMUSCULAR; INTRAVENOUS at 17:36

## 2023-10-16 RX ADMIN — LORAZEPAM 1.5 MG: 2 INJECTION INTRAMUSCULAR; INTRAVENOUS at 08:01

## 2023-10-16 RX ADMIN — LORAZEPAM 0.5 MG: 1 TABLET ORAL at 01:15

## 2023-10-16 RX ADMIN — KETOROLAC TROMETHAMINE 15 MG: 30 INJECTION, SOLUTION INTRAMUSCULAR; INTRAVENOUS at 21:48

## 2023-10-16 RX ADMIN — LORAZEPAM 1.5 MG: 2 INJECTION INTRAMUSCULAR; INTRAVENOUS at 22:47

## 2023-10-16 RX ADMIN — POTASSIUM CHLORIDE 20 MEQ: 1500 TABLET, EXTENDED RELEASE ORAL at 12:16

## 2023-10-16 RX ADMIN — LORAZEPAM 2 MG: 2 INJECTION INTRAMUSCULAR; INTRAVENOUS at 11:20

## 2023-10-16 RX ADMIN — DIAZEPAM 10 MG: 5 TABLET ORAL at 12:17

## 2023-10-16 RX ADMIN — LORAZEPAM 2 MG: 2 INJECTION INTRAMUSCULAR; INTRAVENOUS at 10:47

## 2023-10-16 RX ADMIN — ENOXAPARIN SODIUM 40 MG: 100 INJECTION SUBCUTANEOUS at 17:35

## 2023-10-16 RX ADMIN — DOCUSATE SODIUM 50 MG AND SENNOSIDES 8.6 MG 2 TABLET: 8.6; 5 TABLET, FILM COATED ORAL at 17:35

## 2023-10-16 RX ADMIN — SODIUM CHLORIDE: 9 INJECTION, SOLUTION INTRAVENOUS at 09:23

## 2023-10-16 RX ADMIN — SODIUM CHLORIDE 1000 ML: 9 INJECTION, SOLUTION INTRAVENOUS at 01:43

## 2023-10-16 RX ADMIN — LORAZEPAM 1 MG: 2 INJECTION INTRAMUSCULAR; INTRAVENOUS at 20:15

## 2023-10-16 RX ADMIN — LORAZEPAM 1.5 MG: 2 INJECTION INTRAMUSCULAR; INTRAVENOUS at 16:28

## 2023-10-16 RX ADMIN — LORAZEPAM 2 MG: 2 INJECTION INTRAMUSCULAR; INTRAVENOUS at 10:24

## 2023-10-16 RX ADMIN — LORAZEPAM 1.5 MG: 2 INJECTION INTRAMUSCULAR; INTRAVENOUS at 15:21

## 2023-10-16 RX ADMIN — LORAZEPAM 1.5 MG: 2 INJECTION INTRAMUSCULAR; INTRAVENOUS at 09:27

## 2023-10-16 RX ADMIN — SODIUM CHLORIDE, POTASSIUM CHLORIDE, SODIUM LACTATE AND CALCIUM CHLORIDE: 600; 310; 30; 20 INJECTION, SOLUTION INTRAVENOUS at 12:13

## 2023-10-16 RX ADMIN — DIAZEPAM 10 MG: 5 TABLET ORAL at 17:36

## 2023-10-16 RX ADMIN — MAGNESIUM SULFATE HEPTAHYDRATE 2 G: 2 INJECTION, SOLUTION INTRAVENOUS at 12:16

## 2023-10-16 ASSESSMENT — LIFESTYLE VARIABLES
TOTAL SCORE: 3
NAUSEA AND VOMITING: NO NAUSEA AND NO VOMITING
NAUSEA AND VOMITING: NO NAUSEA AND NO VOMITING
AGITATION: MODERATELY FIDGETY AND RESTLESS
AGITATION: PACES BACK AND FORTH DURING MOST OF THE INTERVIEW OR CONSTANTLY THRASHES ABOUT.
PAROXYSMAL SWEATS: BARELY PERCEPTIBLE SWEATING. PALMS MOIST
HEADACHE, FULLNESS IN HEAD: MODERATELY SEVERE
NAUSEA AND VOMITING: NO NAUSEA AND NO VOMITING
ANXIETY: MODERATELY ANXIOUS OR GUARDED, SO ANXIETY IS INFERRED
ORIENTATION AND CLOUDING OF SENSORIUM: ORIENTED AND CAN DO SERIAL ADDITIONS
AUDITORY DISTURBANCES: NOT PRESENT
AGITATION: *
TREMOR: *
TOTAL SCORE: VERY MILD ITCHING, PINS AND NEEDLES SENSATION, BURNING OR NUMBNESS
TOTAL SCORE: 20
AGITATION: MODERATELY FIDGETY AND RESTLESS
AGITATION: MODERATELY FIDGETY AND RESTLESS
TOTAL SCORE: 5
AUDITORY DISTURBANCES: NOT PRESENT
VISUAL DISTURBANCES: VERY MILD SENSITIVITY
AUDITORY DISTURBANCES: NOT PRESENT
ANXIETY: *
AUDITORY DISTURBANCES: NOT PRESENT
TOTAL SCORE: VERY MILD ITCHING, PINS AND NEEDLES SENSATION, BURNING OR NUMBNESS
TREMOR: NO TREMOR
AGITATION: PACES BACK AND FORTH DURING MOST OF THE INTERVIEW OR CONSTANTLY THRASHES ABOUT.
TREMOR: TREMOR NOT VISIBLE BUT CAN BE FELT, FINGERTIP TO FINGERTIP
NAUSEA AND VOMITING: NO NAUSEA AND NO VOMITING
TREMOR: *
AGITATION: MODERATELY FIDGETY AND RESTLESS
HAVE PEOPLE ANNOYED YOU BY CRITICIZING YOUR DRINKING: YES
TOTAL SCORE: 12
TOTAL SCORE: 19
HEADACHE, FULLNESS IN HEAD: MODERATE
ANXIETY: *
NAUSEA AND VOMITING: NO NAUSEA AND NO VOMITING
HEADACHE, FULLNESS IN HEAD: MODERATE
TOTAL SCORE: 16
AGITATION: *
AUDITORY DISTURBANCES: NOT PRESENT
AUDITORY DISTURBANCES: NOT PRESENT
ANXIETY: *
DOES PATIENT WANT TO TALK TO SOMEONE ABOUT QUITTING: NO
PAROXYSMAL SWEATS: *
AUDITORY DISTURBANCES: NOT PRESENT
ORIENTATION AND CLOUDING OF SENSORIUM: ORIENTED AND CAN DO SERIAL ADDITIONS
TOTAL SCORE: 16
PAROXYSMAL SWEATS: BEADS OF SWEAT OBVIOUS ON FOREHEAD
VISUAL DISTURBANCES: NOT PRESENT
ORIENTATION AND CLOUDING OF SENSORIUM: ORIENTED AND CAN DO SERIAL ADDITIONS
HEADACHE, FULLNESS IN HEAD: MILD
TREMOR: MODERATE TREMOR WITH ARMS EXTENDED
TREMOR: *
ANXIETY: *
HEADACHE, FULLNESS IN HEAD: MODERATE
AUDITORY DISTURBANCES: NOT PRESENT
AUDITORY DISTURBANCES: NOT PRESENT
VISUAL DISTURBANCES: NOT PRESENT
TREMOR: *
VISUAL DISTURBANCES: NOT PRESENT
VISUAL DISTURBANCES: NOT PRESENT
HEADACHE, FULLNESS IN HEAD: NOT PRESENT
ANXIETY: MODERATELY ANXIOUS OR GUARDED, SO ANXIETY IS INFERRED
VISUAL DISTURBANCES: NOT PRESENT
PAROXYSMAL SWEATS: *
TREMOR: *
NAUSEA AND VOMITING: NO NAUSEA AND NO VOMITING
ORIENTATION AND CLOUDING OF SENSORIUM: ORIENTED AND CAN DO SERIAL ADDITIONS
ORIENTATION AND CLOUDING OF SENSORIUM: ORIENTED AND CAN DO SERIAL ADDITIONS
ANXIETY: *
ANXIETY: MODERATELY ANXIOUS OR GUARDED, SO ANXIETY IS INFERRED
SUBSTANCE_ABUSE: 1
ORIENTATION AND CLOUDING OF SENSORIUM: ORIENTED AND CAN DO SERIAL ADDITIONS
ORIENTATION AND CLOUDING OF SENSORIUM: ORIENTED AND CAN DO SERIAL ADDITIONS
DOES PATIENT WANT TO STOP DRINKING: YES
TOTAL SCORE: VERY MILD ITCHING, PINS AND NEEDLES SENSATION, BURNING OR NUMBNESS
VISUAL DISTURBANCES: NOT PRESENT
ANXIETY: MILDLY ANXIOUS
HEADACHE, FULLNESS IN HEAD: NOT PRESENT
TOTAL SCORE: VERY MILD ITCHING, PINS AND NEEDLES SENSATION, BURNING OR NUMBNESS
PAROXYSMAL SWEATS: *
TOTAL SCORE: 17
AUDITORY DISTURBANCES: NOT PRESENT
AUDITORY DISTURBANCES: NOT PRESENT
AGITATION: *
VISUAL DISTURBANCES: NOT PRESENT
TOTAL SCORE: 15
VISUAL DISTURBANCES: VERY MILD SENSITIVITY
VISUAL DISTURBANCES: NOT PRESENT
ALCOHOL_USE: YES
NAUSEA AND VOMITING: NO NAUSEA AND NO VOMITING
AGITATION: PACES BACK AND FORTH DURING MOST OF THE INTERVIEW OR CONSTANTLY THRASHES ABOUT.
TOTAL SCORE: 3
ANXIETY: MODERATELY ANXIOUS OR GUARDED, SO ANXIETY IS INFERRED
PAROXYSMAL SWEATS: BEADS OF SWEAT OBVIOUS ON FOREHEAD
VISUAL DISTURBANCES: NOT PRESENT
PAROXYSMAL SWEATS: BEADS OF SWEAT OBVIOUS ON FOREHEAD
TOTAL SCORE: VERY MILD ITCHING, PINS AND NEEDLES SENSATION, BURNING OR NUMBNESS
CONSUMPTION TOTAL: POSITIVE
TREMOR: *
AGITATION: *
TOTAL SCORE: 26
TOTAL SCORE: 3
TOTAL SCORE: 15
HEADACHE, FULLNESS IN HEAD: SEVERE
ANXIETY: *
TREMOR: *
HAVE YOU EVER FELT YOU SHOULD CUT DOWN ON YOUR DRINKING: YES
NAUSEA AND VOMITING: NO NAUSEA AND NO VOMITING
NAUSEA AND VOMITING: NO NAUSEA AND NO VOMITING
ORIENTATION AND CLOUDING OF SENSORIUM: ORIENTED AND CAN DO SERIAL ADDITIONS
ON A TYPICAL DAY WHEN YOU DRINK ALCOHOL HOW MANY DRINKS DO YOU HAVE: 3
PAROXYSMAL SWEATS: BARELY PERCEPTIBLE SWEATING. PALMS MOIST
PAROXYSMAL SWEATS: *
HOW MANY TIMES IN THE PAST YEAR HAVE YOU HAD 5 OR MORE DRINKS IN A DAY: 200
PAROXYSMAL SWEATS: *
ORIENTATION AND CLOUDING OF SENSORIUM: ORIENTED AND CAN DO SERIAL ADDITIONS
PAROXYSMAL SWEATS: *
ANXIETY: *
AGITATION: *
HEADACHE, FULLNESS IN HEAD: MODERATE
TOTAL SCORE: 12
NAUSEA AND VOMITING: NO NAUSEA AND NO VOMITING
VISUAL DISTURBANCES: NOT PRESENT
TREMOR: *
NAUSEA AND VOMITING: NO NAUSEA AND NO VOMITING
VISUAL DISTURBANCES: NOT PRESENT
TREMOR: *
TOTAL SCORE: 26
TREMOR: MODERATE TREMOR WITH ARMS EXTENDED
AVERAGE NUMBER OF DAYS PER WEEK YOU HAVE A DRINK CONTAINING ALCOHOL: 5
TOTAL SCORE: VERY MILD ITCHING, PINS AND NEEDLES SENSATION, BURNING OR NUMBNESS
ORIENTATION AND CLOUDING OF SENSORIUM: ORIENTED AND CAN DO SERIAL ADDITIONS
NAUSEA AND VOMITING: NO NAUSEA AND NO VOMITING
PAROXYSMAL SWEATS: *
AUDITORY DISTURBANCES: NOT PRESENT
HEADACHE, FULLNESS IN HEAD: MODERATELY SEVERE
NAUSEA AND VOMITING: NO NAUSEA AND NO VOMITING
HEADACHE, FULLNESS IN HEAD: NOT PRESENT
TREMOR: *
TOTAL SCORE: VERY MILD ITCHING, PINS AND NEEDLES SENSATION, BURNING OR NUMBNESS
ORIENTATION AND CLOUDING OF SENSORIUM: ORIENTED AND CAN DO SERIAL ADDITIONS
HEADACHE, FULLNESS IN HEAD: MILD
HEADACHE, FULLNESS IN HEAD: MILD
AUDITORY DISTURBANCES: NOT PRESENT
NAUSEA AND VOMITING: NO NAUSEA AND NO VOMITING
PAROXYSMAL SWEATS: *
PAROXYSMAL SWEATS: BARELY PERCEPTIBLE SWEATING. PALMS MOIST
TOTAL SCORE: 26
PAROXYSMAL SWEATS: *
EVER HAD A DRINK FIRST THING IN THE MORNING TO STEADY YOUR NERVES TO GET RID OF A HANGOVER: NO
VISUAL DISTURBANCES: NOT PRESENT
TREMOR: *
TOTAL SCORE: 10
AUDITORY DISTURBANCES: NOT PRESENT
VISUAL DISTURBANCES: NOT PRESENT
TOTAL SCORE: 14
AGITATION: MODERATELY FIDGETY AND RESTLESS
AGITATION: MODERATELY FIDGETY AND RESTLESS
ORIENTATION AND CLOUDING OF SENSORIUM: ORIENTED AND CAN DO SERIAL ADDITIONS
TOTAL SCORE: VERY MILD ITCHING, PINS AND NEEDLES SENSATION, BURNING OR NUMBNESS
ORIENTATION AND CLOUDING OF SENSORIUM: ORIENTED AND CAN DO SERIAL ADDITIONS
AGITATION: MODERATELY FIDGETY AND RESTLESS
HEADACHE, FULLNESS IN HEAD: MILD
NAUSEA AND VOMITING: NO NAUSEA AND NO VOMITING
ANXIETY: MILDLY ANXIOUS
ORIENTATION AND CLOUDING OF SENSORIUM: CANNOT DO SERIAL ADDITIONS OR IS UNCERTAIN ABOUT DATE
ORIENTATION AND CLOUDING OF SENSORIUM: ORIENTED AND CAN DO SERIAL ADDITIONS
AUDITORY DISTURBANCES: NOT PRESENT
HEADACHE, FULLNESS IN HEAD: NOT PRESENT
EVER FELT BAD OR GUILTY ABOUT YOUR DRINKING: YES

## 2023-10-16 ASSESSMENT — COGNITIVE AND FUNCTIONAL STATUS - GENERAL
STANDING UP FROM CHAIR USING ARMS: A LITTLE
DRESSING REGULAR UPPER BODY CLOTHING: A LITTLE
SUGGESTED CMS G CODE MODIFIER DAILY ACTIVITY: CJ
DAILY ACTIVITIY SCORE: 20
SUGGESTED CMS G CODE MODIFIER MOBILITY: CK
MOVING TO AND FROM BED TO CHAIR: A LITTLE
HELP NEEDED FOR BATHING: A LITTLE
TOILETING: A LITTLE
CLIMB 3 TO 5 STEPS WITH RAILING: A LOT
WALKING IN HOSPITAL ROOM: A LOT
DRESSING REGULAR LOWER BODY CLOTHING: A LITTLE
MOVING FROM LYING ON BACK TO SITTING ON SIDE OF FLAT BED: A LITTLE
MOBILITY SCORE: 17

## 2023-10-16 ASSESSMENT — ENCOUNTER SYMPTOMS
FEVER: 0
TREMORS: 1
CHILLS: 0
NERVOUS/ANXIOUS: 1
NAUSEA: 0
MYALGIAS: 0
ABDOMINAL PAIN: 0
WEAKNESS: 1
VOMITING: 0

## 2023-10-16 ASSESSMENT — PAIN DESCRIPTION - PAIN TYPE
TYPE: ACUTE PAIN

## 2023-10-16 ASSESSMENT — PATIENT HEALTH QUESTIONNAIRE - PHQ9
1. LITTLE INTEREST OR PLEASURE IN DOING THINGS: NOT AT ALL
SUM OF ALL RESPONSES TO PHQ9 QUESTIONS 1 AND 2: 0
2. FEELING DOWN, DEPRESSED, IRRITABLE, OR HOPELESS: NOT AT ALL

## 2023-10-16 ASSESSMENT — FIBROSIS 4 INDEX: FIB4 SCORE: 2.62

## 2023-10-16 NOTE — ASSESSMENT & PLAN NOTE
Incidentally discovered, outpatient US appropriate.  TSH is slightly elevated at 5.5 and normal free T4
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
## 2023-10-16 NOTE — ED NOTES
Bedside report received from off going RN Leonila, assumed care of patient.  POC discussed with patient. Call light within reach, all needs addressed at this time.       Fall risk interventions in place: Move the patient closer to the nurse's station, Place socks on patient, Place fall risk sign on patient's door, Keep floor surfaces clean and dry, Accompanied to restroom, and Human-sitter if tele-sitter fails (all applicable per [place] Fall risk assessment)   Continuous monitoring: Pulse Ox or Blood Pressure  IVF/IV medications: Not Applicable   Oxygen: Room Air  Bedside sitter: Pt on L2k SI with 1:1 sitter   (name)  Isolation: Not Applicable

## 2023-10-16 NOTE — ED NOTES
Pt is alert and Oriented x 4  Ambulatory but with unsteady gait  Security at bedside and removed the remaining restraints at right wrist and left ankle    Checked on bed, with unlabored respirations. No safety risk noted  Sitter - 1:1 with continuous visual monitoring by Trained Personnel  Continued safety precaution  Ashrrani in low position, side rail up for pt safety.   No needs identified at the moment
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
## 2023-10-16 NOTE — ED NOTES
Patient ambulatory up to bathroom with unsteady gait, CIWA completed, medicated per mar, requesting to speak with someone in regards to how long he will be here, informed he is currently on an L2K and explained L2K process, patient verbalizes understanding,  breakfast provided to patient, denies further needs at this time

## 2023-10-16 NOTE — ED NOTES
Report called to VIMAL Hankins. Will continue to monitor pt while awaiting RN arrival to take pt upstairs.
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
## 2023-10-16 NOTE — PROGRESS NOTES
4 Eyes Skin Assessment Completed by VIMAL Haknins and GUERO Claudio    Head Redness  Ears Redness  Nose Redness  Mouth Redness and Bleeding  laceration on upper lip   Neck Redness  Breast/Chest Redness and Bruising  Shoulder Blades WDL  Spine Redness  (R) Arm/Elbow/Hand Redness and Blanching  (L) Arm/Elbow/Hand Redness and Blanching  Abdomen WDL  Groin WDL  Scrotum/Coccyx/Buttocks WDL  (R) Leg Bruising and Swelling  (L) Leg Bruising and Abrasion  (R) Heel/Foot/Toe WDL  (L) Heel/Foot/Toe WDL          Devices In Places Tele Box, Blood Pressure Cuff, Pulse Ox, and Nasal Cannula      Interventions In Place Gray Ear Foams    Possible Skin Injury Yes    Pictures Uploaded Into Epic Yes  Wound Consult Placed Yes  RN Wound Prevention Protocol Ordered No
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
## 2023-10-16 NOTE — ED NOTES
Pt ambulatory to bathroom with moderate assistance. Pt stated he needed to have BM but did not urinate or defecate while on the toilet. Pt assisted back to bed and is asking about his wife and when she will be here. Will check chart and will help pt call wife, if appropriate. 1:1 sitter still in view of pt.

## 2023-10-16 NOTE — ED NOTES
ERP at bedside  Pt is alert and oriented x 3  Reoriented pt and explained legal hold and safety issues  Pt verbalized understanding that he is on legal hold and not allowed to go home yet  Called security to remove the remaining restraints

## 2023-10-16 NOTE — ED NOTES
Pt medicated per MAR and tolerated well. Pt asking if he can go home now and this RN explained to pt that he is being admitted to the hospital for alcohol withdrawals and will not be going home any time soon. Pt demonstrated understanding and was cooperative. 1:1 sitter still in view of pt, will continue to monitor pt while awaiting bed assignment upstairs.

## 2023-10-16 NOTE — ED NOTES
Checked on bed, with unlabored respirations. No safety risk noted  Awake on bed  Sitter - 1:1 with continuous visual monitoring by Trained Personnel  Continued safety precaution  Ashrrani in low position, side rail up for pt safety.   No needs identified at the moment

## 2023-10-16 NOTE — ED NOTES
Pt combative once in room from CT.  Pt pulling off lines and c-collar.  Pt yelling at staff.  ERP to room and security called.  Pt medicated per MAR and 4 pt hard restraints placed.

## 2023-10-16 NOTE — CARE PLAN
"The patient is Watcher - Medium risk of patient condition declining or worsening    Shift Goals  Clinical Goals: Monitor VS; CIWA and safety  Patient Goals: go home    Progress made toward(s) clinical / shift goals:  patient unable to follow command at times and need to be re oriented, sitter at bedside,BP (!) 178/95   Pulse 70   Temp 37 °C (98.6 °F)   Resp 18   Ht 1.88 m (6' 2\")   Wt 106 kg (234 lb 9.1 oz)   SpO2 92%           Problem: Knowledge Deficit - Standard  Goal: Patient and family/care givers will demonstrate understanding of plan of care, disease process/condition, diagnostic tests and medications  Outcome: Not Progressing     Problem: Optimal Care for Alcohol Withdrawal  Goal: Optimal Care for the alcohol withdrawal patient  Outcome: Not Progressing     "

## 2023-10-16 NOTE — ED NOTES
Bedside given to: Eneida RN, removed self from care of patient.  POC discussed with patient. Call light within reach, all needs addressed at this time.       Fall risk interventions in place: Move the patient closer to the nurse's station, Patient's personal possessions are with in their safe reach, Place socks on patient, Place fall risk sign on patient's door, Give patient urinal if applicable, Keep floor surfaces clean and dry, Accompanied to restroom, Bed Alarm in use, and Human-sitter if tele-sitter fails (all applicable per [place] Fall risk assessment)   Continuous monitoring: Cardiac Leads, Pulse Ox, or Blood Pressure  IVF/IV medications: Infusion per MAR (List Med(s)) NS bolus   Oxygen: How many liters 2L, Traced the line to wall oxygen, and No oxygen tank in room  Bedside sitter: Pt on L2k SI with 1:1 sitter   (name), Report given to sitter, checklist completed, and checklist completed, stop sign in doorway  Isolation: Not Applicable

## 2023-10-16 NOTE — ED NOTES
Checked on bed, with unlabored respirations. No safety risk noted  Sleeping  Sitter - 1:1 with continuous visual monitoring by Trained Personnel  Continued safety precaution  Ashrrani in low position, side rail up for pt safety.   No needs identified at the moment

## 2023-10-16 NOTE — ED NOTES
Bedside report received from Violet STEPHEN RN. Pt transferred to [place] [id number] by RN via [facility]. At this time pt is resting in bed with even and unlabored breaths, in no apparent distress. Pt is connected to monitoring devices and is on 2L O2 via NC, tolerating well. Fall and Suicide precautions are in place, 1:1 sitter in view of pt. Will continue to monitor.

## 2023-10-16 NOTE — ED NOTES
Bedside report given to Violet, assumed care of patient.  POC discussed with patient. Call light within reach, all needs addressed at this time.       Fall risk interventions in place: Not Applicable (all applicable per [place] Fall risk assessment)   Continuous monitoring: Pulse Ox or Blood Pressure  IVF/IV medications: Not Applicable   Oxygen: How many liters 2L  Bedside sitter: Pt on L2k SI with 1:1 sitter   (name)  Isolation: Not Applicable
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
## 2023-10-16 NOTE — ED NOTES
Spoke with patients wife, requesting to come  car keys and also relay that he takes verapamil 240mg once a day for VTACH

## 2023-10-16 NOTE — ED NOTES
ERP and security at bedside  Re oriented pt and established pt safety  Removed the restraint at left wrist and right ankle    Checked on bed, with unlabored respirations. No safety risk noted  Awake on bed, but easily going back to sleep  Sitter - 1:1 with continuous visual monitoring by Trained Personnel  Continued safety precaution  Gurney in low position, side rail up for pt safety.   No needs identified at the moment
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
## 2023-10-16 NOTE — ED NOTES
Pt is starting to wake up, but still confused, tried to re orient and re established pt safety but there is no evidence of learning

## 2023-10-16 NOTE — ED NOTES
Pt medicated with 1.5mg Ativan IVP per MAR for CIWA of 16. Pt being taken upstairs at this time by  via gurney on cardiac monitor and O2. Pt is awake and alert, talking to staff, in no apparent distress at time of transfer. Pt's paperwork and belongings (2 bags from locker #5) sent upstairs with pt, 1:1 sitter, and .

## 2023-10-16 NOTE — ASSESSMENT & PLAN NOTE
Continue verapamil 240 mg daily which is his outpatient dose.  He does remain unclear if he actually takes it though.   Somewhat improved  Adjust prn, no changes planned on 10/23  Will need further outpatient adjustments, continued elevated DBP

## 2023-10-16 NOTE — ED NOTES
Checked on bed, with unlabored respirations. No safety risk noted  Sleeping  Sitter - 1:1 with continuous visual monitoring by Trained Personnel  Continued safety precaution  Gurney in low position, side rail up for pt safety.   No needs identified at the moment    On 4 point hard restraints, cms intact, no signs of injury

## 2023-10-16 NOTE — ED NOTES
Bedside report received from previous shift.   Assumed patient care. Verified patient identification.  Checked on bed, connected to monitor,  with unlabored respirations. Discussed plan of care.   Vital signs is stable. Denied any new complaints.   Gurney in low position, side rail up for pt safety. Call light within reach.   No needs identified at the moment. Instructed to use call light when needed.    Contraptions: on 4 point restraints due to aggression  Alert and Oriented: Sedated  Ambulatory: Not at the moment  Oxygen: 6 LPM via oximask  Pending: Reassessment when sober up    On legal hold for SI, with 1:1 sitter at bedside

## 2023-10-16 NOTE — ED NOTES
Ambulated to the rest room; still not that steady but patient now is more cooperative; follows command and answers question appropriately.

## 2023-10-16 NOTE — ED NOTES
Bedside report received from off going RN/tech: Dbeo RN, assumed care of patient.  POC discussed with patient. Call light within reach, all needs addressed at this time.       Fall risk interventions in place: Move the patient closer to the nurse's station, Patient's personal possessions are with in their safe reach, Place socks on patient, Keep floor surfaces clean and dry, Accompanied to restroom, and Human-sitter if tele-sitter fails (all applicable per [place] Fall risk assessment)   Continuous monitoring: Cardiac Leads, Pulse Ox, or Blood Pressure  IVF/IV medications: Not Applicable   Oxygen: How many liters 2L, Traced the line to wall oxygen, and No oxygen tank in room  Bedside sitter: Pt on L2k SI with 1:1 sitter   (name), Report given to sitter, checklist completed, and checklist completed, stop sign in doorway  Isolation: Not Applicable

## 2023-10-16 NOTE — ED NOTES
Pt medicated per MAR for CIWA.    Called security to reposition and loosen pt restraints.  Pt restless and pulling at restraints.

## 2023-10-16 NOTE — ASSESSMENT & PLAN NOTE
No anemia  Suspect related to ongoing etoh abuse  Check Vitamin B1 and B12 levels, consider MMA levels

## 2023-10-16 NOTE — ED NOTES
Bedside report received from off going RN: Mauricio, assumed care of patient.  POC discussed with patient. Call light within reach, all needs addressed at this time.       Fall risk interventions in place: Move the patient closer to the nurse's station, Keep floor surfaces clean and dry, and Bed Alarm in use (all applicable per [place] Fall risk assessment)   Continuous monitoring: Cardiac Leads, Pulse Ox, or Blood Pressure  IVF/IV medications: Infusion per MAR (List Med(s)) Rally bag  Oxygen: How many liters 6L oxymask  Bedside sitter: Pt on L2k SI with 1:1 sitter Leann (name)

## 2023-10-16 NOTE — ASSESSMENT & PLAN NOTE
See above  Recent sabiha, multiple psychosocial stressors  Lost his job yesterday (unclear if related to ETOH)
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
## 2023-10-16 NOTE — ED NOTES
"Patient becoming increasingly agitated despite frequent CIWA medication, ERP notified that CIWA is up to 26, medicated per mar, patient to be admitted. Patient continues to say \"well they said I was good to go\" requires frequent redirection and instruction that he is NOT leaving and is not cleared to be discharged.   "

## 2023-10-16 NOTE — ASSESSMENT & PLAN NOTE
On a legal hold and a sitter at bedside.    Mental status continues to improve, no SI expressed on 10/23  Remains on   Psychiatry following

## 2023-10-16 NOTE — ED NOTES
Per note from psychiatry pt is not currently allowed visitors or phone calls so this RN will not be calling his wife. (Per notes pt also stated that his wife left him prior to his suicide attempt).
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
## 2023-10-17 PROBLEM — F10.231 ALCOHOL DEPENDENCE WITH WITHDRAWAL DELIRIUM (HCC): Status: ACTIVE | Noted: 2023-10-16

## 2023-10-17 LAB
ALBUMIN SERPL BCP-MCNC: 3.9 G/DL (ref 3.2–4.9)
ALBUMIN/GLOB SERPL: 1.4 G/DL
ALP SERPL-CCNC: 66 U/L (ref 30–99)
ALT SERPL-CCNC: 32 U/L (ref 2–50)
ANION GAP SERPL CALC-SCNC: 12 MMOL/L (ref 7–16)
AST SERPL-CCNC: 43 U/L (ref 12–45)
BASOPHILS # BLD AUTO: 0.7 % (ref 0–1.8)
BASOPHILS # BLD: 0.06 K/UL (ref 0–0.12)
BILIRUB SERPL-MCNC: 4.3 MG/DL (ref 0.1–1.5)
BUN SERPL-MCNC: 10 MG/DL (ref 8–22)
CALCIUM ALBUM COR SERPL-MCNC: 9.4 MG/DL (ref 8.5–10.5)
CALCIUM SERPL-MCNC: 9.3 MG/DL (ref 8.5–10.5)
CHLORIDE SERPL-SCNC: 100 MMOL/L (ref 96–112)
CO2 SERPL-SCNC: 25 MMOL/L (ref 20–33)
CREAT SERPL-MCNC: 0.77 MG/DL (ref 0.5–1.4)
EOSINOPHIL # BLD AUTO: 0.02 K/UL (ref 0–0.51)
EOSINOPHIL NFR BLD: 0.2 % (ref 0–6.9)
ERYTHROCYTE [DISTWIDTH] IN BLOOD BY AUTOMATED COUNT: 44.5 FL (ref 35.9–50)
GFR SERPLBLD CREATININE-BSD FMLA CKD-EPI: 110 ML/MIN/1.73 M 2
GLOBULIN SER CALC-MCNC: 2.8 G/DL (ref 1.9–3.5)
GLUCOSE SERPL-MCNC: 99 MG/DL (ref 65–99)
HCT VFR BLD AUTO: 42.6 % (ref 42–52)
HGB BLD-MCNC: 14.6 G/DL (ref 14–18)
IMM GRANULOCYTES # BLD AUTO: 0.04 K/UL (ref 0–0.11)
IMM GRANULOCYTES NFR BLD AUTO: 0.4 % (ref 0–0.9)
LYMPHOCYTES # BLD AUTO: 1.61 K/UL (ref 1–4.8)
LYMPHOCYTES NFR BLD: 18.1 % (ref 22–41)
MAGNESIUM SERPL-MCNC: 1.9 MG/DL (ref 1.5–2.5)
MCH RBC QN AUTO: 35.5 PG (ref 27–33)
MCHC RBC AUTO-ENTMCNC: 34.3 G/DL (ref 32.3–36.5)
MCV RBC AUTO: 103.6 FL (ref 81.4–97.8)
MONOCYTES # BLD AUTO: 0.94 K/UL (ref 0–0.85)
MONOCYTES NFR BLD AUTO: 10.5 % (ref 0–13.4)
NEUTROPHILS # BLD AUTO: 6.24 K/UL (ref 1.82–7.42)
NEUTROPHILS NFR BLD: 70.1 % (ref 44–72)
NRBC # BLD AUTO: 0 K/UL
NRBC BLD-RTO: 0 /100 WBC (ref 0–0.2)
PHOSPHATE SERPL-MCNC: 3.4 MG/DL (ref 2.5–4.5)
PLATELET # BLD AUTO: 150 K/UL (ref 164–446)
PMV BLD AUTO: 10.8 FL (ref 9–12.9)
POTASSIUM SERPL-SCNC: 4.1 MMOL/L (ref 3.6–5.5)
PROT SERPL-MCNC: 6.7 G/DL (ref 6–8.2)
RBC # BLD AUTO: 4.11 M/UL (ref 4.7–6.1)
SODIUM SERPL-SCNC: 137 MMOL/L (ref 135–145)
WBC # BLD AUTO: 8.9 K/UL (ref 4.8–10.8)

## 2023-10-17 PROCEDURE — 700105 HCHG RX REV CODE 258: Performed by: INTERNAL MEDICINE

## 2023-10-17 PROCEDURE — A9270 NON-COVERED ITEM OR SERVICE: HCPCS | Performed by: INTERNAL MEDICINE

## 2023-10-17 PROCEDURE — 85025 COMPLETE CBC W/AUTO DIFF WBC: CPT

## 2023-10-17 PROCEDURE — A9270 NON-COVERED ITEM OR SERVICE: HCPCS | Performed by: STUDENT IN AN ORGANIZED HEALTH CARE EDUCATION/TRAINING PROGRAM

## 2023-10-17 PROCEDURE — 99292 CRITICAL CARE ADDL 30 MIN: CPT | Performed by: INTERNAL MEDICINE

## 2023-10-17 PROCEDURE — A9270 NON-COVERED ITEM OR SERVICE: HCPCS | Performed by: NURSE PRACTITIONER

## 2023-10-17 PROCEDURE — 84100 ASSAY OF PHOSPHORUS: CPT

## 2023-10-17 PROCEDURE — 700102 HCHG RX REV CODE 250 W/ 637 OVERRIDE(OP): Performed by: INTERNAL MEDICINE

## 2023-10-17 PROCEDURE — 99232 SBSQ HOSP IP/OBS MODERATE 35: CPT | Mod: GC | Performed by: PSYCHIATRY & NEUROLOGY

## 2023-10-17 PROCEDURE — 83735 ASSAY OF MAGNESIUM: CPT

## 2023-10-17 PROCEDURE — 99291 CRITICAL CARE FIRST HOUR: CPT | Performed by: STUDENT IN AN ORGANIZED HEALTH CARE EDUCATION/TRAINING PROGRAM

## 2023-10-17 PROCEDURE — 700111 HCHG RX REV CODE 636 W/ 250 OVERRIDE (IP): Mod: JZ

## 2023-10-17 PROCEDURE — 700111 HCHG RX REV CODE 636 W/ 250 OVERRIDE (IP): Performed by: INTERNAL MEDICINE

## 2023-10-17 PROCEDURE — 700102 HCHG RX REV CODE 250 W/ 637 OVERRIDE(OP): Performed by: STUDENT IN AN ORGANIZED HEALTH CARE EDUCATION/TRAINING PROGRAM

## 2023-10-17 PROCEDURE — 84425 ASSAY OF VITAMIN B-1: CPT

## 2023-10-17 PROCEDURE — 700102 HCHG RX REV CODE 250 W/ 637 OVERRIDE(OP): Performed by: NURSE PRACTITIONER

## 2023-10-17 PROCEDURE — 93005 ELECTROCARDIOGRAM TRACING: CPT | Performed by: INTERNAL MEDICINE

## 2023-10-17 PROCEDURE — 80053 COMPREHEN METABOLIC PANEL: CPT

## 2023-10-17 PROCEDURE — 770022 HCHG ROOM/CARE - ICU (200)

## 2023-10-17 PROCEDURE — 700105 HCHG RX REV CODE 258: Performed by: STUDENT IN AN ORGANIZED HEALTH CARE EDUCATION/TRAINING PROGRAM

## 2023-10-17 PROCEDURE — 99291 CRITICAL CARE FIRST HOUR: CPT | Performed by: INTERNAL MEDICINE

## 2023-10-17 RX ORDER — PHENOBARBITAL SODIUM 130 MG/ML
260 INJECTION INTRAMUSCULAR; INTRAVENOUS
Status: DISCONTINUED | OUTPATIENT
Start: 2023-10-17 | End: 2023-10-18

## 2023-10-17 RX ORDER — SODIUM CHLORIDE, SODIUM LACTATE, POTASSIUM CHLORIDE, CALCIUM CHLORIDE 600; 310; 30; 20 MG/100ML; MG/100ML; MG/100ML; MG/100ML
INJECTION, SOLUTION INTRAVENOUS CONTINUOUS
Status: ACTIVE | OUTPATIENT
Start: 2023-10-17 | End: 2023-10-18

## 2023-10-17 RX ORDER — ESCITALOPRAM OXALATE 10 MG/1
20 TABLET ORAL DAILY
Status: DISCONTINUED | OUTPATIENT
Start: 2023-10-17 | End: 2023-10-25 | Stop reason: HOSPADM

## 2023-10-17 RX ORDER — PHENOBARBITAL SODIUM 130 MG/ML
130 INJECTION INTRAMUSCULAR; INTRAVENOUS
Status: DISCONTINUED | OUTPATIENT
Start: 2023-10-17 | End: 2023-10-18

## 2023-10-17 RX ORDER — LABETALOL HYDROCHLORIDE 5 MG/ML
10-20 INJECTION, SOLUTION INTRAVENOUS EVERY 4 HOURS PRN
Status: DISCONTINUED | OUTPATIENT
Start: 2023-10-17 | End: 2023-10-25 | Stop reason: HOSPADM

## 2023-10-17 RX ORDER — HYDRALAZINE HYDROCHLORIDE 20 MG/ML
20 INJECTION INTRAMUSCULAR; INTRAVENOUS EVERY 4 HOURS PRN
Status: DISCONTINUED | OUTPATIENT
Start: 2023-10-17 | End: 2023-10-25 | Stop reason: HOSPADM

## 2023-10-17 RX ORDER — GAUZE BANDAGE 2" X 2"
100 BANDAGE TOPICAL DAILY
Status: DISCONTINUED | OUTPATIENT
Start: 2023-10-21 | End: 2023-10-25 | Stop reason: HOSPADM

## 2023-10-17 RX ADMIN — PHENOBARBITAL SODIUM 260 MG: 130 INJECTION INTRAMUSCULAR at 19:14

## 2023-10-17 RX ADMIN — Medication 400 MG: at 09:07

## 2023-10-17 RX ADMIN — DIAZEPAM 10 MG: 5 TABLET ORAL at 09:08

## 2023-10-17 RX ADMIN — POTASSIUM CHLORIDE 20 MEQ: 1500 TABLET, EXTENDED RELEASE ORAL at 09:07

## 2023-10-17 RX ADMIN — PHENOBARBITAL SODIUM 260 MG: 130 INJECTION INTRAMUSCULAR at 19:36

## 2023-10-17 RX ADMIN — ENOXAPARIN SODIUM 40 MG: 100 INJECTION SUBCUTANEOUS at 17:07

## 2023-10-17 RX ADMIN — LORAZEPAM 1 MG: 2 INJECTION INTRAMUSCULAR; INTRAVENOUS at 13:58

## 2023-10-17 RX ADMIN — LABETALOL HYDROCHLORIDE 10 MG: 5 INJECTION INTRAVENOUS at 19:06

## 2023-10-17 RX ADMIN — SODIUM CHLORIDE, POTASSIUM CHLORIDE, SODIUM LACTATE AND CALCIUM CHLORIDE: 600; 310; 30; 20 INJECTION, SOLUTION INTRAVENOUS at 07:02

## 2023-10-17 RX ADMIN — DIAZEPAM 10 MG: 5 TABLET ORAL at 01:05

## 2023-10-17 RX ADMIN — SODIUM CHLORIDE, POTASSIUM CHLORIDE, SODIUM LACTATE AND CALCIUM CHLORIDE: 600; 310; 30; 20 INJECTION, SOLUTION INTRAVENOUS at 19:36

## 2023-10-17 RX ADMIN — LORAZEPAM 2 MG: 2 INJECTION INTRAMUSCULAR; INTRAVENOUS at 15:09

## 2023-10-17 RX ADMIN — THIAMINE HYDROCHLORIDE 500 MG: 100 INJECTION, SOLUTION INTRAMUSCULAR; INTRAVENOUS at 20:56

## 2023-10-17 RX ADMIN — SODIUM CHLORIDE, POTASSIUM CHLORIDE, SODIUM LACTATE AND CALCIUM CHLORIDE: 600; 310; 30; 20 INJECTION, SOLUTION INTRAVENOUS at 14:36

## 2023-10-17 RX ADMIN — LABETALOL HYDROCHLORIDE 10 MG: 5 INJECTION INTRAVENOUS at 19:08

## 2023-10-17 RX ADMIN — PHENOBARBITAL SODIUM 260 MG: 130 INJECTION INTRAMUSCULAR at 20:51

## 2023-10-17 RX ADMIN — DIAZEPAM 5 MG: 5 TABLET ORAL at 11:09

## 2023-10-17 RX ADMIN — Medication 100 MG: at 08:00

## 2023-10-17 RX ADMIN — FOLIC ACID 1 MG: 1 TABLET ORAL at 09:07

## 2023-10-17 RX ADMIN — IBUPROFEN 600 MG: 600 TABLET, FILM COATED ORAL at 09:08

## 2023-10-17 RX ADMIN — VERAPAMIL HYDROCHLORIDE 240 MG: 240 TABLET, FILM COATED, EXTENDED RELEASE ORAL at 09:07

## 2023-10-17 RX ADMIN — PHENOBARBITAL SODIUM 260 MG: 130 INJECTION INTRAMUSCULAR at 20:06

## 2023-10-17 RX ADMIN — LORAZEPAM 0.5 MG: 2 INJECTION INTRAMUSCULAR; INTRAVENOUS at 03:28

## 2023-10-17 RX ADMIN — THERA TABS 1 TABLET: TAB at 08:00

## 2023-10-17 RX ADMIN — LORAZEPAM 1 MG: 2 INJECTION INTRAMUSCULAR; INTRAVENOUS at 01:04

## 2023-10-17 RX ADMIN — PHENOBARBITAL SODIUM 130 MG: 130 INJECTION INTRAMUSCULAR; INTRAVENOUS at 18:42

## 2023-10-17 RX ADMIN — POTASSIUM CHLORIDE 20 MEQ: 1500 TABLET, EXTENDED RELEASE ORAL at 17:07

## 2023-10-17 RX ADMIN — HYDRALAZINE HYDROCHLORIDE 20 MG: 20 INJECTION, SOLUTION INTRAMUSCULAR; INTRAVENOUS at 23:30

## 2023-10-17 RX ADMIN — LORAZEPAM 4 MG: 2 TABLET ORAL at 11:08

## 2023-10-17 RX ADMIN — HALOPERIDOL LACTATE 5 MG: 5 INJECTION, SOLUTION INTRAMUSCULAR at 03:59

## 2023-10-17 RX ADMIN — LORAZEPAM 2 MG: 2 INJECTION INTRAMUSCULAR; INTRAVENOUS at 15:56

## 2023-10-17 RX ADMIN — LORAZEPAM 2 MG: 2 INJECTION INTRAMUSCULAR; INTRAVENOUS at 14:35

## 2023-10-17 RX ADMIN — ESCITALOPRAM OXALATE 20 MG: 10 TABLET ORAL at 15:09

## 2023-10-17 RX ADMIN — LABETALOL HYDROCHLORIDE 10 MG: 5 INJECTION INTRAVENOUS at 06:18

## 2023-10-17 RX ADMIN — DIAZEPAM 5 MG: 5 TABLET ORAL at 17:07

## 2023-10-17 RX ADMIN — LORAZEPAM 2 MG: 2 INJECTION INTRAMUSCULAR; INTRAVENOUS at 17:08

## 2023-10-17 RX ADMIN — HALOPERIDOL LACTATE 5 MG: 5 INJECTION, SOLUTION INTRAMUSCULAR at 16:27

## 2023-10-17 RX ADMIN — DOCUSATE SODIUM 50 MG AND SENNOSIDES 8.6 MG 2 TABLET: 8.6; 5 TABLET, FILM COATED ORAL at 09:07

## 2023-10-17 ASSESSMENT — LIFESTYLE VARIABLES
ORIENTATION AND CLOUDING OF SENSORIUM: ORIENTED AND CAN DO SERIAL ADDITIONS
TREMOR: MODERATE TREMOR WITH ARMS EXTENDED
VISUAL DISTURBANCES: VERY MILD SENSITIVITY
ORIENTATION AND CLOUDING OF SENSORIUM: ORIENTED AND CAN DO SERIAL ADDITIONS
AGITATION: PACES BACK AND FORTH DURING MOST OF THE INTERVIEW OR CONSTANTLY THRASHES ABOUT.
TOTAL SCORE: VERY MILD ITCHING, PINS AND NEEDLES SENSATION, BURNING OR NUMBNESS
VISUAL DISTURBANCES: MODERATE SENSITIVITY
HEADACHE, FULLNESS IN HEAD: NOT PRESENT
VISUAL DISTURBANCES: NOT PRESENT
TREMOR: MODERATE TREMOR WITH ARMS EXTENDED
HEADACHE, FULLNESS IN HEAD: MILD
TOTAL SCORE: 15
HEADACHE, FULLNESS IN HEAD: NOT PRESENT
ANXIETY: *
VISUAL DISTURBANCES: NOT PRESENT
AGITATION: PACES BACK AND FORTH DURING MOST OF THE INTERVIEW OR CONSTANTLY THRASHES ABOUT.
ORIENTATION AND CLOUDING OF SENSORIUM: ORIENTED AND CAN DO SERIAL ADDITIONS
ANXIETY: *
VISUAL DISTURBANCES: NOT PRESENT
TREMOR: MODERATE TREMOR WITH ARMS EXTENDED
TOTAL SCORE: 25
HEADACHE, FULLNESS IN HEAD: NOT PRESENT
NAUSEA AND VOMITING: NO NAUSEA AND NO VOMITING
TREMOR: *
ORIENTATION AND CLOUDING OF SENSORIUM: ORIENTED AND CAN DO SERIAL ADDITIONS
AUDITORY DISTURBANCES: NOT PRESENT
AGITATION: MODERATELY FIDGETY AND RESTLESS
ANXIETY: *
ANXIETY: *
NAUSEA AND VOMITING: NO NAUSEA AND NO VOMITING
HEADACHE, FULLNESS IN HEAD: NOT PRESENT
AGITATION: *
HEADACHE, FULLNESS IN HEAD: NOT PRESENT
PAROXYSMAL SWEATS: *
ORIENTATION AND CLOUDING OF SENSORIUM: DATE DISORIENTATION BY NO MORE THAN TWO CALENDAR DAYS
NAUSEA AND VOMITING: NO NAUSEA AND NO VOMITING
ANXIETY: *
TREMOR: MODERATE TREMOR WITH ARMS EXTENDED
AUDITORY DISTURBANCES: NOT PRESENT
PAROXYSMAL SWEATS: BARELY PERCEPTIBLE SWEATING. PALMS MOIST
TREMOR: MODERATE TREMOR WITH ARMS EXTENDED
AUDITORY DISTURBANCES: NOT PRESENT
ANXIETY: *
PAROXYSMAL SWEATS: BARELY PERCEPTIBLE SWEATING. PALMS MOIST
NAUSEA AND VOMITING: NO NAUSEA AND NO VOMITING
AGITATION: PACES BACK AND FORTH DURING MOST OF THE INTERVIEW OR CONSTANTLY THRASHES ABOUT.
VISUAL DISTURBANCES: NOT PRESENT
HEADACHE, FULLNESS IN HEAD: MILD
ANXIETY: MODERATELY ANXIOUS OR GUARDED, SO ANXIETY IS INFERRED
TOTAL SCORE: 23
NAUSEA AND VOMITING: NO NAUSEA AND NO VOMITING
VISUAL DISTURBANCES: NOT PRESENT
ORIENTATION AND CLOUDING OF SENSORIUM: ORIENTED AND CAN DO SERIAL ADDITIONS
NAUSEA AND VOMITING: NO NAUSEA AND NO VOMITING
AGITATION: PACES BACK AND FORTH DURING MOST OF THE INTERVIEW OR CONSTANTLY THRASHES ABOUT.
ANXIETY: *
AGITATION: PACES BACK AND FORTH DURING MOST OF THE INTERVIEW OR CONSTANTLY THRASHES ABOUT.
AUDITORY DISTURBANCES: NOT PRESENT
AGITATION: SOMEWHAT MORE THAN NORMAL ACTIVITY
AUDITORY DISTURBANCES: NOT PRESENT
TOTAL SCORE: VERY MILD ITCHING, PINS AND NEEDLES SENSATION, BURNING OR NUMBNESS
HEADACHE, FULLNESS IN HEAD: NOT PRESENT
TREMOR: *
HEADACHE, FULLNESS IN HEAD: NOT PRESENT
TOTAL SCORE: 12
NAUSEA AND VOMITING: NO NAUSEA AND NO VOMITING
PAROXYSMAL SWEATS: *
AUDITORY DISTURBANCES: NOT PRESENT
TREMOR: *
TOTAL SCORE: 20
NAUSEA AND VOMITING: NO NAUSEA AND NO VOMITING
NAUSEA AND VOMITING: NO NAUSEA AND NO VOMITING
AUDITORY DISTURBANCES: NOT PRESENT
PAROXYSMAL SWEATS: *
NAUSEA AND VOMITING: NO NAUSEA AND NO VOMITING
AUDITORY DISTURBANCES: NOT PRESENT
AGITATION: PACES BACK AND FORTH DURING MOST OF THE INTERVIEW OR CONSTANTLY THRASHES ABOUT.
AUDITORY DISTURBANCES: NOT PRESENT
ORIENTATION AND CLOUDING OF SENSORIUM: DATE DISORIENTATION BY NO MORE THAN TWO CALENDAR DAYS
AGITATION: *
ANXIETY: *
AGITATION: NORMAL ACTIVITY
PAROXYSMAL SWEATS: *
ANXIETY: *
VISUAL DISTURBANCES: NOT PRESENT
TOTAL SCORE: 8
PAROXYSMAL SWEATS: *
AUDITORY DISTURBANCES: NOT PRESENT
ORIENTATION AND CLOUDING OF SENSORIUM: ORIENTED AND CAN DO SERIAL ADDITIONS
NAUSEA AND VOMITING: NO NAUSEA AND NO VOMITING
TOTAL SCORE: 7
TREMOR: MODERATE TREMOR WITH ARMS EXTENDED
TREMOR: MODERATE TREMOR WITH ARMS EXTENDED
VISUAL DISTURBANCES: MODERATE SENSITIVITY
ANXIETY: *
PAROXYSMAL SWEATS: *
PAROXYSMAL SWEATS: *
TOTAL SCORE: 13
TOTAL SCORE: 20
HEADACHE, FULLNESS IN HEAD: NOT PRESENT
ANXIETY: *
TREMOR: *
AUDITORY DISTURBANCES: NOT PRESENT
TOTAL SCORE: 21
AGITATION: *
ORIENTATION AND CLOUDING OF SENSORIUM: ORIENTED AND CAN DO SERIAL ADDITIONS
PAROXYSMAL SWEATS: BEADS OF SWEAT OBVIOUS ON FOREHEAD
TREMOR: MODERATE TREMOR WITH ARMS EXTENDED
HEADACHE, FULLNESS IN HEAD: NOT PRESENT
AUDITORY DISTURBANCES: NOT PRESENT
PAROXYSMAL SWEATS: *
VISUAL DISTURBANCES: NOT PRESENT
TOTAL SCORE: 20
TOTAL SCORE: VERY MILD ITCHING, PINS AND NEEDLES SENSATION, BURNING OR NUMBNESS
ORIENTATION AND CLOUDING OF SENSORIUM: DATE DISORIENTATION BY NO MORE THAN TWO CALENDAR DAYS
NAUSEA AND VOMITING: NO NAUSEA AND NO VOMITING
TOTAL SCORE: VERY MILD ITCHING, PINS AND NEEDLES SENSATION, BURNING OR NUMBNESS
VISUAL DISTURBANCES: NOT PRESENT
HEADACHE, FULLNESS IN HEAD: NOT PRESENT
ORIENTATION AND CLOUDING OF SENSORIUM: DATE DISORIENTATION BY NO MORE THAN TWO CALENDAR DAYS
ORIENTATION AND CLOUDING OF SENSORIUM: ORIENTED AND CAN DO SERIAL ADDITIONS
PAROXYSMAL SWEATS: *
TOTAL SCORE: 13
VISUAL DISTURBANCES: NOT PRESENT

## 2023-10-17 ASSESSMENT — FIBROSIS 4 INDEX: FIB4 SCORE: 2.43

## 2023-10-17 ASSESSMENT — PAIN DESCRIPTION - PAIN TYPE
TYPE: ACUTE PAIN
TYPE: ACUTE PAIN

## 2023-10-17 NOTE — PROGRESS NOTES
Pt is with sitter. Pt belongings searched, and included a pair of shoes, jeans, a hoodie, and a hat. There was no cell phone found or wallet. Belongings are locked up.

## 2023-10-17 NOTE — DISCHARGE PLANNING
"Case Management Discharge Planning    Admission Date: 10/15/2023  GMLOS: 3.4  ALOS: 1    6-Clicks ADL Score: 20  6-Clicks Mobility Score: 17  PT and/or OT Eval ordered: No  Post-acute Referrals Ordered: No  Post-acute Choice Obtained: No  Has referral(s) been sent to post-acute provider:  No      Anticipated Discharge Dispo: Discharge Disposition: Still a Patient (30)    DME Needed: No    Action(s) Taken: chart reviewed.Pt discussed during IDT rounds.    Escalations Completed: None    Medically Clear: No    Next Steps: f/u with pt and medical team to discuss dc needs and barriers. Pending \"telemedicine mental health to meet with court physicians on 10/18 \"per alert team.     Barriers to Discharge: Medical clearance    Is the patient up for discharge tomorrow: No        "

## 2023-10-17 NOTE — DISCHARGE PLANNING
Legal Hold    Referral: Legal Hold Court     Intervention: Pt presented for legal hold meeting with  via phone call.  advised pt will meet with court MD's via telemedicine monitor to contest the legal hold.      Plan: Pt will present to telemedicine mental health to meet with court physicians 10/18. Will call bedside RN once time has been determined.
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
## 2023-10-17 NOTE — PROGRESS NOTES
Monitor Summary:  Rhythm: SR Rate: 64-83  Ectopies: occasional PVC  Measurement: .12/.08/.44
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
## 2023-10-17 NOTE — CARE PLAN
The patient is Stable - Low risk of patient condition declining or worsening    Shift Goals  Clinical Goals: hemodynamic stability, CIWA, safety  Patient Goals: go home    Progress made toward(s) clinical / shift goals:  Yes    Patient is not progressing towards the following goals:

## 2023-10-17 NOTE — CARE PLAN
The patient is Watcher - Medium risk of patient condition declining or worsening    Shift Goals  Clinical Goals: monitor VSs; CIWA and safety  Patient Goals: go home    Progress made toward(s) clinical / shift goals:    Problem: Pain - Standard  Goal: Alleviation of pain or a reduction in pain to the patient’s comfort goal  Outcome: Progressing  Note: on call hospital contacted for pain medication for patient. Patient's pain is treated per MAR.     Problem: Knowledge Deficit - Standard  Goal: Patient and family/care givers will demonstrate understanding of plan of care, disease process/condition, diagnostic tests and medications  Outcome: Progressing  Note: Reiterate to patient about legal hold and safety precaution     Problem: Optimal Care for Alcohol Withdrawal  Goal: Optimal Care for the alcohol withdrawal patient  Outcome: Progressing  Note: CIWA protocol in progress.     Problem: Seizure Precautions  Goal: Implementation of seizure precautions  Outcome: Progressing  Note:  Bed rails padded with blankets. Suctioning device in place.     Problem: Psychosocial  Goal: Patient's level of anxiety will decrease  Outcome: Progressing     Problem: Risk for Aspiration  Goal: Patient's risk for aspiration will be absent or decrease  Outcome: Progressing  Note: Head of bed greater than 30 degrees. Suctioning device in place.     Problem: Provide Safe Environment  Goal: Suicide environmental safety, protocols, policies, and practices will be implemented  Outcome: Progressing  Note: one to one sitter is in the room with patience.      Problem: Fall Risk  Goal: Patient will remain free from falls  Outcome: Progressing  Note: Sitter is in the room with patient.       Patient is not progressing towards the following goals:

## 2023-10-18 LAB
ANION GAP SERPL CALC-SCNC: 17 MMOL/L (ref 7–16)
BASOPHILS # BLD AUTO: 0.4 % (ref 0–1.8)
BASOPHILS # BLD: 0.05 K/UL (ref 0–0.12)
BUN SERPL-MCNC: 11 MG/DL (ref 8–22)
CALCIUM SERPL-MCNC: 9.7 MG/DL (ref 8.5–10.5)
CHLORIDE SERPL-SCNC: 93 MMOL/L (ref 96–112)
CO2 SERPL-SCNC: 22 MMOL/L (ref 20–33)
CREAT SERPL-MCNC: 0.69 MG/DL (ref 0.5–1.4)
EKG IMPRESSION: NORMAL
EOSINOPHIL # BLD AUTO: 0.03 K/UL (ref 0–0.51)
EOSINOPHIL NFR BLD: 0.3 % (ref 0–6.9)
ERYTHROCYTE [DISTWIDTH] IN BLOOD BY AUTOMATED COUNT: 44.2 FL (ref 35.9–50)
GFR SERPLBLD CREATININE-BSD FMLA CKD-EPI: 114 ML/MIN/1.73 M 2
GLUCOSE SERPL-MCNC: 112 MG/DL (ref 65–99)
HCT VFR BLD AUTO: 48 % (ref 42–52)
HGB BLD-MCNC: 16.4 G/DL (ref 14–18)
IMM GRANULOCYTES # BLD AUTO: 0.05 K/UL (ref 0–0.11)
IMM GRANULOCYTES NFR BLD AUTO: 0.4 % (ref 0–0.9)
LYMPHOCYTES # BLD AUTO: 1 K/UL (ref 1–4.8)
LYMPHOCYTES NFR BLD: 8.8 % (ref 22–41)
MAGNESIUM SERPL-MCNC: 1.7 MG/DL (ref 1.5–2.5)
MCH RBC QN AUTO: 34.6 PG (ref 27–33)
MCHC RBC AUTO-ENTMCNC: 34.2 G/DL (ref 32.3–36.5)
MCV RBC AUTO: 101.3 FL (ref 81.4–97.8)
MONOCYTES # BLD AUTO: 0.94 K/UL (ref 0–0.85)
MONOCYTES NFR BLD AUTO: 8.3 % (ref 0–13.4)
NEUTROPHILS # BLD AUTO: 9.32 K/UL (ref 1.82–7.42)
NEUTROPHILS NFR BLD: 81.8 % (ref 44–72)
NRBC # BLD AUTO: 0 K/UL
NRBC BLD-RTO: 0 /100 WBC (ref 0–0.2)
PHOSPHATE SERPL-MCNC: 3.2 MG/DL (ref 2.5–4.5)
PLATELET # BLD AUTO: 173 K/UL (ref 164–446)
PMV BLD AUTO: 10.8 FL (ref 9–12.9)
POTASSIUM SERPL-SCNC: 3.9 MMOL/L (ref 3.6–5.5)
RBC # BLD AUTO: 4.74 M/UL (ref 4.7–6.1)
SODIUM SERPL-SCNC: 132 MMOL/L (ref 135–145)
WBC # BLD AUTO: 11.4 K/UL (ref 4.8–10.8)

## 2023-10-18 PROCEDURE — 700102 HCHG RX REV CODE 250 W/ 637 OVERRIDE(OP): Performed by: INTERNAL MEDICINE

## 2023-10-18 PROCEDURE — 700101 HCHG RX REV CODE 250: Performed by: INTERNAL MEDICINE

## 2023-10-18 PROCEDURE — 700111 HCHG RX REV CODE 636 W/ 250 OVERRIDE (IP): Mod: JZ | Performed by: INTERNAL MEDICINE

## 2023-10-18 PROCEDURE — 700105 HCHG RX REV CODE 258: Performed by: INTERNAL MEDICINE

## 2023-10-18 PROCEDURE — 83735 ASSAY OF MAGNESIUM: CPT

## 2023-10-18 PROCEDURE — 93010 ELECTROCARDIOGRAM REPORT: CPT | Performed by: INTERNAL MEDICINE

## 2023-10-18 PROCEDURE — 700102 HCHG RX REV CODE 250 W/ 637 OVERRIDE(OP): Performed by: STUDENT IN AN ORGANIZED HEALTH CARE EDUCATION/TRAINING PROGRAM

## 2023-10-18 PROCEDURE — 770022 HCHG ROOM/CARE - ICU (200)

## 2023-10-18 PROCEDURE — A9270 NON-COVERED ITEM OR SERVICE: HCPCS | Performed by: INTERNAL MEDICINE

## 2023-10-18 PROCEDURE — 99232 SBSQ HOSP IP/OBS MODERATE 35: CPT | Mod: GC | Performed by: PSYCHIATRY & NEUROLOGY

## 2023-10-18 PROCEDURE — 85025 COMPLETE CBC W/AUTO DIFF WBC: CPT

## 2023-10-18 PROCEDURE — 700102 HCHG RX REV CODE 250 W/ 637 OVERRIDE(OP): Mod: JZ | Performed by: INTERNAL MEDICINE

## 2023-10-18 PROCEDURE — A9270 NON-COVERED ITEM OR SERVICE: HCPCS | Mod: JZ | Performed by: INTERNAL MEDICINE

## 2023-10-18 PROCEDURE — 700105 HCHG RX REV CODE 258: Performed by: STUDENT IN AN ORGANIZED HEALTH CARE EDUCATION/TRAINING PROGRAM

## 2023-10-18 PROCEDURE — 84100 ASSAY OF PHOSPHORUS: CPT

## 2023-10-18 PROCEDURE — 80048 BASIC METABOLIC PNL TOTAL CA: CPT

## 2023-10-18 PROCEDURE — A9270 NON-COVERED ITEM OR SERVICE: HCPCS | Performed by: STUDENT IN AN ORGANIZED HEALTH CARE EDUCATION/TRAINING PROGRAM

## 2023-10-18 PROCEDURE — 99291 CRITICAL CARE FIRST HOUR: CPT | Performed by: INTERNAL MEDICINE

## 2023-10-18 PROCEDURE — 700111 HCHG RX REV CODE 636 W/ 250 OVERRIDE (IP): Performed by: INTERNAL MEDICINE

## 2023-10-18 RX ORDER — POTASSIUM CHLORIDE 7.45 MG/ML
10 INJECTION INTRAVENOUS
Status: COMPLETED | OUTPATIENT
Start: 2023-10-18 | End: 2023-10-18

## 2023-10-18 RX ORDER — DEXMEDETOMIDINE HYDROCHLORIDE 4 UG/ML
.1-1.5 INJECTION, SOLUTION INTRAVENOUS CONTINUOUS
Status: DISCONTINUED | OUTPATIENT
Start: 2023-10-18 | End: 2023-10-19

## 2023-10-18 RX ORDER — POTASSIUM CHLORIDE 20 MEQ/1
40 TABLET, EXTENDED RELEASE ORAL ONCE
Status: ACTIVE | OUTPATIENT
Start: 2023-10-18 | End: 2023-10-19

## 2023-10-18 RX ORDER — MAGNESIUM SULFATE HEPTAHYDRATE 40 MG/ML
2 INJECTION, SOLUTION INTRAVENOUS ONCE
Status: COMPLETED | OUTPATIENT
Start: 2023-10-18 | End: 2023-10-18

## 2023-10-18 RX ORDER — LORAZEPAM 2 MG/ML
4 INJECTION INTRAMUSCULAR
Status: DISCONTINUED | OUTPATIENT
Start: 2023-10-18 | End: 2023-10-22

## 2023-10-18 RX ORDER — CLONIDINE 0.1 MG/24H
1 PATCH, EXTENDED RELEASE TRANSDERMAL
Status: DISCONTINUED | OUTPATIENT
Start: 2023-10-18 | End: 2023-10-19

## 2023-10-18 RX ADMIN — DEXMEDETOMIDINE 0.2 MCG/KG/HR: 100 INJECTION, SOLUTION INTRAVENOUS at 14:05

## 2023-10-18 RX ADMIN — LABETALOL HYDROCHLORIDE 20 MG: 5 INJECTION INTRAVENOUS at 02:03

## 2023-10-18 RX ADMIN — POTASSIUM CHLORIDE 10 MEQ: 7.46 INJECTION, SOLUTION INTRAVENOUS at 10:29

## 2023-10-18 RX ADMIN — PHENOBARBITAL SODIUM 260 MG: 130 INJECTION INTRAMUSCULAR at 09:58

## 2023-10-18 RX ADMIN — POTASSIUM CHLORIDE 10 MEQ: 7.46 INJECTION, SOLUTION INTRAVENOUS at 08:38

## 2023-10-18 RX ADMIN — PHENOBARBITAL SODIUM 130 MG: 130 INJECTION INTRAMUSCULAR; INTRAVENOUS at 08:07

## 2023-10-18 RX ADMIN — ESCITALOPRAM OXALATE 20 MG: 10 TABLET ORAL at 05:39

## 2023-10-18 RX ADMIN — PHENOBARBITAL SODIUM 260 MG: 130 INJECTION INTRAMUSCULAR at 13:18

## 2023-10-18 RX ADMIN — SODIUM CHLORIDE, POTASSIUM CHLORIDE, SODIUM LACTATE AND CALCIUM CHLORIDE: 600; 310; 30; 20 INJECTION, SOLUTION INTRAVENOUS at 04:22

## 2023-10-18 RX ADMIN — THERA TABS 1 TABLET: TAB at 05:39

## 2023-10-18 RX ADMIN — FOLIC ACID 1 MG: 1 TABLET ORAL at 05:39

## 2023-10-18 RX ADMIN — HYDRALAZINE HYDROCHLORIDE 20 MG: 20 INJECTION, SOLUTION INTRAMUSCULAR; INTRAVENOUS at 04:07

## 2023-10-18 RX ADMIN — PHENOBARBITAL SODIUM 130 MG: 130 INJECTION INTRAMUSCULAR; INTRAVENOUS at 04:20

## 2023-10-18 RX ADMIN — PHENOBARBITAL SODIUM 130 MG: 130 INJECTION INTRAMUSCULAR; INTRAVENOUS at 01:56

## 2023-10-18 RX ADMIN — PHENOBARBITAL SODIUM 260 MG: 130 INJECTION INTRAMUSCULAR at 11:40

## 2023-10-18 RX ADMIN — ENOXAPARIN SODIUM 40 MG: 100 INJECTION SUBCUTANEOUS at 18:11

## 2023-10-18 RX ADMIN — CLONIDINE 1 PATCH: 0.1 PATCH TRANSDERMAL at 10:37

## 2023-10-18 RX ADMIN — PHENOBARBITAL SODIUM 260 MG: 130 INJECTION INTRAMUSCULAR at 09:08

## 2023-10-18 RX ADMIN — VERAPAMIL HYDROCHLORIDE 240 MG: 240 TABLET, FILM COATED, EXTENDED RELEASE ORAL at 05:39

## 2023-10-18 RX ADMIN — MAGNESIUM SULFATE HEPTAHYDRATE 2 G: 2 INJECTION, SOLUTION INTRAVENOUS at 08:33

## 2023-10-18 ASSESSMENT — PAIN DESCRIPTION - PAIN TYPE
TYPE: ACUTE PAIN

## 2023-10-18 ASSESSMENT — FIBROSIS 4 INDEX: FIB4 SCORE: 2.11
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
## 2023-10-18 NOTE — CARE PLAN
The patient is Stable - Low risk of patient condition declining or worsening    Shift Goals  Clinical Goals: Safety, initiate phenobarbital protocol, SBP >160  Patient Goals: LOAN  Family Goals: LOAN    Progress made toward(s) clinical / shift goals:    Problem: Safety - Violent/Self-destructive Restraint  Goal: Remains free of injury from restraints (Restraint for Violent/Self-Destructive Behavior)  Outcome: Progressing    Problem: Pain - Standard  Goal: Alleviation of pain or a reduction in pain to the patient’s comfort goal  Outcome: Progressing     Problem: Knowledge Deficit - Standard  Goal: Patient and family/care givers will demonstrate understanding of plan of care, disease process/condition, diagnostic tests and medications  Outcome: Progressing     Problem: Optimal Care for Alcohol Withdrawal  Goal: Optimal Care for the alcohol withdrawal patient  Outcome: Progressing       Patient is not progressing towards the following goals:      Problem: Safety - Violent/Self-destructive Restraint  Goal: Free from restraints (Restraint for Violent or Self-Destructive Behavior)  Outcome: Not Progressing

## 2023-10-18 NOTE — CARE PLAN
The patient is Watcher - Medium risk of patient condition declining or worsening    Shift Goals  Clinical Goals: Safety, alcohol withdrawal management,  Patient Goals: Unable to assess  Family Goals: No family present    Progress made toward(s) clinical / shift goals:    Problem: Optimal Care for Alcohol Withdrawal  Goal: Optimal Care for the alcohol withdrawal patient  Outcome: Progressing     Problem: Psychosocial  Goal: Patient's level of anxiety will decrease  Outcome: Progressing     Problem: Fall Risk  Goal: Patient will remain free from falls  Outcome: Progressing     Problem: Safety - Medical Restraint  Goal: Remains free of injury from restraints (Restraint for Interference with Medical Device)  Outcome: Progressing   Q2h restraint monitoring.

## 2023-10-18 NOTE — ASSESSMENT & PLAN NOTE
10/17BA of 0.406 on admission  He has escalating CIWA scores despite 50 mg of diazepam and 34.5 mg of lorazepam since 0115 hrs on 10/16 so transferred to icu 10/16 pm and receive 4 doses of IV phenobarbital based upon serial RASS assessments with good results  Being monitored  High dose IV thiamine and supplemental vitamins      10/19: has improved -- so transitioned to librium 25 mg tid and prn ativan. Will need sitter

## 2023-10-18 NOTE — THERAPY
Occupational Therapy Contact Note    Patient Name: Diego Velasquez  Age:  48 y.o., Sex:  male  Medical Record #: [id number]  Today's Date: 10/18/2023           10/18/23 1513   Initial Contact Note    Initial Contact Note Order Received and Verified, Occupational Therapy Evaluation in Progress with Full Report to Follow.   Interdisciplinary Plan of Care Collaboration   Collaboration Comments OT order recieved and acknowleged, not yet medically stable to initiate OT services will monitor status and initate as indicated   Session Information   Date / Session Number  10/18 HOLD

## 2023-10-18 NOTE — PROGRESS NOTES
Pt admit at 1825  HR 92  /123  RR32  Temp 97.8f  SPO2 92% on Room air  Weight 103.8 kg    No Pt belongings.     4 Eyes Skin Assessment Completed by Evita CALIXTO RN and Evita LOPEZ RN.    Head Redness abrasion left cheek  Ears Redness and Blanching  Nose Redness dry blood   Mouth Redness tongue bites bilaterally  Neck WDL  Breast/Chest WDL  Shoulder Blades WDL  Spine WDL  (R) Arm/Elbow/Hand Scab hands scattered  (L) Arm/Elbow/Hand Scab hands scattered  Abdomen WDL  Groin WDL  Scrotum/Coccyx/Buttocks WDL  (R) Leg Bruising  (L) Leg Bruising  (R) Heel/Foot/Toe Bruising heel   (L) Heel/Foot/Toe WDL          Devices In Places ECG, Blood Pressure Cuff, Pulse Ox, and SCD's      Interventions In Place Sacral Mepilex, Pillows, and Low Air Loss Mattress    Possible Skin Injury No    Pictures Uploaded Into Epic N/A  Wound Consult Placed N/A  RN Wound Prevention Protocol Ordered No

## 2023-10-18 NOTE — ASSESSMENT & PLAN NOTE
Goal SBP less than 160  Continue verapamil ER, 240 mg daily  IV hydralazine and labetalol to achieve blood pressure goals
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
## 2023-10-18 NOTE — DISCHARGE PLANNING
Legal Hold    Referral: Legal Hold Court     Intervention: Pt met with court MD's via telemedicine monitor to contest the legal hold.      Levar attempted to talk to patient regarding him stating he wanted to contest his legal hold yesterday.  Pt unable to participate in meeting, per Levar legal hold will be continued for one more week (10/26).    Updated bedside RN Leslie and LSW Irene.

## 2023-10-18 NOTE — DISCHARGE PLANNING
"Case Management Discharge Planning    Admission Date: 10/15/2023  GMLOS: 3.4  ALOS: 2    6-Clicks ADL Score: 20  6-Clicks Mobility Score: 17  PT and/or OT Eval ordered: No  Post-acute Referrals Ordered: No  Post-acute Choice Obtained: No  Has referral(s) been sent to post-acute provider:  No      Anticipated Discharge Dispo: Discharge Disposition: Still a Patient (30)    DME Needed: No    Action(s) Taken: Updated Provider/Nurse on Discharge Plan     LMSW attended rounds and updated team that pt was scheduled to meet w/ Court MD's today to contest his legal hold. Pt was transferred from T8 to ICU level of care and speech presents as word salad. Pt w/ ETOH withdrawal and has sitter at bedside. LMSW reached out to Legal Hold DPACheryl, to provide update. Cheryl reports that she will reach out to the Court but she may still have to come to bedside with iPad so the Court MD's can \"attempt\" to talk to the pt. Bedside RN updated.     Escalations Completed: None    Medically Clear: No    Next Steps: LMSW will continue to follow.     Barriers to Discharge: Medical clearance. Pt is on a legal hold.     Is the patient up for discharge tomorrow: No        "

## 2023-10-18 NOTE — ASSESSMENT & PLAN NOTE
Evaluated by Dr. Ward on presentation  Recommends TLSO brace when out of bed and outpatient follow up - neurosurgery has signed off

## 2023-10-18 NOTE — DISCHARGE PLANNING
Spoke to  Levar regarding patient's meeting with the court doctors today. Patient has been scheduled to meet with court doctors this morning at 1050.    Was notified by GABRIELE Bonilla and Charge VIMAL Cuadra that pt is unable to participate in meeting due to not being coherent, pt transferred to ICU.    Sent email to  Levar regarding pt's situation, waiting on reply from the court.  If pt still needs to be seen by the Court Doctors, Ipad will be taken to pt at bedside.  Notified Charge VIMAL Cuadra.
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
## 2023-10-19 PROBLEM — R17 SERUM TOTAL BILIRUBIN ELEVATED: Status: ACTIVE | Noted: 2023-10-19

## 2023-10-19 PROBLEM — F10.10 ALCOHOL ABUSE: Status: RESOLVED | Noted: 2023-10-16 | Resolved: 2023-10-19

## 2023-10-19 LAB
ANION GAP SERPL CALC-SCNC: 13 MMOL/L (ref 7–16)
BASOPHILS # BLD AUTO: 0.9 % (ref 0–1.8)
BASOPHILS # BLD: 0.08 K/UL (ref 0–0.12)
BUN SERPL-MCNC: 18 MG/DL (ref 8–22)
CALCIUM SERPL-MCNC: 8.8 MG/DL (ref 8.5–10.5)
CHLORIDE SERPL-SCNC: 99 MMOL/L (ref 96–112)
CO2 SERPL-SCNC: 22 MMOL/L (ref 20–33)
CREAT SERPL-MCNC: 0.8 MG/DL (ref 0.5–1.4)
EKG IMPRESSION: NORMAL
EOSINOPHIL # BLD AUTO: 0.11 K/UL (ref 0–0.51)
EOSINOPHIL NFR BLD: 1.3 % (ref 0–6.9)
ERYTHROCYTE [DISTWIDTH] IN BLOOD BY AUTOMATED COUNT: 45.3 FL (ref 35.9–50)
GFR SERPLBLD CREATININE-BSD FMLA CKD-EPI: 109 ML/MIN/1.73 M 2
GLUCOSE SERPL-MCNC: 94 MG/DL (ref 65–99)
HCT VFR BLD AUTO: 42.4 % (ref 42–52)
HGB BLD-MCNC: 14.4 G/DL (ref 14–18)
IMM GRANULOCYTES # BLD AUTO: 0.03 K/UL (ref 0–0.11)
IMM GRANULOCYTES NFR BLD AUTO: 0.4 % (ref 0–0.9)
LYMPHOCYTES # BLD AUTO: 1.24 K/UL (ref 1–4.8)
LYMPHOCYTES NFR BLD: 14.6 % (ref 22–41)
MAGNESIUM SERPL-MCNC: 2.1 MG/DL (ref 1.5–2.5)
MCH RBC QN AUTO: 35.5 PG (ref 27–33)
MCHC RBC AUTO-ENTMCNC: 34 G/DL (ref 32.3–36.5)
MCV RBC AUTO: 104.4 FL (ref 81.4–97.8)
MONOCYTES # BLD AUTO: 0.87 K/UL (ref 0–0.85)
MONOCYTES NFR BLD AUTO: 10.2 % (ref 0–13.4)
NEUTROPHILS # BLD AUTO: 6.16 K/UL (ref 1.82–7.42)
NEUTROPHILS NFR BLD: 72.6 % (ref 44–72)
NRBC # BLD AUTO: 0 K/UL
NRBC BLD-RTO: 0 /100 WBC (ref 0–0.2)
PHOSPHATE SERPL-MCNC: 4.4 MG/DL (ref 2.5–4.5)
PLATELET # BLD AUTO: 175 K/UL (ref 164–446)
PMV BLD AUTO: 10.6 FL (ref 9–12.9)
POTASSIUM SERPL-SCNC: 4.2 MMOL/L (ref 3.6–5.5)
RBC # BLD AUTO: 4.06 M/UL (ref 4.7–6.1)
SODIUM SERPL-SCNC: 134 MMOL/L (ref 135–145)
VIT B1 BLD-MCNC: 147 NMOL/L (ref 70–180)
WBC # BLD AUTO: 8.5 K/UL (ref 4.8–10.8)

## 2023-10-19 PROCEDURE — 93010 ELECTROCARDIOGRAM REPORT: CPT | Performed by: INTERNAL MEDICINE

## 2023-10-19 PROCEDURE — 85025 COMPLETE CBC W/AUTO DIFF WBC: CPT

## 2023-10-19 PROCEDURE — 700102 HCHG RX REV CODE 250 W/ 637 OVERRIDE(OP): Performed by: INTERNAL MEDICINE

## 2023-10-19 PROCEDURE — 97163 PT EVAL HIGH COMPLEX 45 MIN: CPT

## 2023-10-19 PROCEDURE — A9270 NON-COVERED ITEM OR SERVICE: HCPCS | Performed by: STUDENT IN AN ORGANIZED HEALTH CARE EDUCATION/TRAINING PROGRAM

## 2023-10-19 PROCEDURE — 700102 HCHG RX REV CODE 250 W/ 637 OVERRIDE(OP): Performed by: HOSPITALIST

## 2023-10-19 PROCEDURE — A9270 NON-COVERED ITEM OR SERVICE: HCPCS | Performed by: HOSPITALIST

## 2023-10-19 PROCEDURE — 700111 HCHG RX REV CODE 636 W/ 250 OVERRIDE (IP): Performed by: INTERNAL MEDICINE

## 2023-10-19 PROCEDURE — 700105 HCHG RX REV CODE 258: Performed by: INTERNAL MEDICINE

## 2023-10-19 PROCEDURE — 80048 BASIC METABOLIC PNL TOTAL CA: CPT

## 2023-10-19 PROCEDURE — 700101 HCHG RX REV CODE 250: Performed by: INTERNAL MEDICINE

## 2023-10-19 PROCEDURE — 93005 ELECTROCARDIOGRAM TRACING: CPT | Performed by: INTERNAL MEDICINE

## 2023-10-19 PROCEDURE — 83735 ASSAY OF MAGNESIUM: CPT

## 2023-10-19 PROCEDURE — 700111 HCHG RX REV CODE 636 W/ 250 OVERRIDE (IP): Mod: JZ | Performed by: INTERNAL MEDICINE

## 2023-10-19 PROCEDURE — 90837 PSYTX W PT 60 MINUTES: CPT | Performed by: SOCIAL WORKER

## 2023-10-19 PROCEDURE — 700102 HCHG RX REV CODE 250 W/ 637 OVERRIDE(OP): Performed by: STUDENT IN AN ORGANIZED HEALTH CARE EDUCATION/TRAINING PROGRAM

## 2023-10-19 PROCEDURE — 84100 ASSAY OF PHOSPHORUS: CPT

## 2023-10-19 PROCEDURE — 99233 SBSQ HOSP IP/OBS HIGH 50: CPT | Performed by: INTERNAL MEDICINE

## 2023-10-19 PROCEDURE — 770001 HCHG ROOM/CARE - MED/SURG/GYN PRIV*

## 2023-10-19 PROCEDURE — A9270 NON-COVERED ITEM OR SERVICE: HCPCS | Performed by: INTERNAL MEDICINE

## 2023-10-19 PROCEDURE — 99233 SBSQ HOSP IP/OBS HIGH 50: CPT | Performed by: HOSPITALIST

## 2023-10-19 RX ORDER — CHLORDIAZEPOXIDE HYDROCHLORIDE 25 MG/1
25 CAPSULE, GELATIN COATED ORAL EVERY 8 HOURS
Status: DISCONTINUED | OUTPATIENT
Start: 2023-10-19 | End: 2023-10-21

## 2023-10-19 RX ADMIN — THIAMINE HYDROCHLORIDE 500 MG: 100 INJECTION, SOLUTION INTRAMUSCULAR; INTRAVENOUS at 13:32

## 2023-10-19 RX ADMIN — DEXMEDETOMIDINE 0.2 MCG/KG/HR: 100 INJECTION, SOLUTION INTRAVENOUS at 04:42

## 2023-10-19 RX ADMIN — FOLIC ACID 1 MG: 1 TABLET ORAL at 06:08

## 2023-10-19 RX ADMIN — RIVAROXABAN 10 MG: 10 TABLET, FILM COATED ORAL at 17:52

## 2023-10-19 RX ADMIN — LORAZEPAM 4 MG: 2 INJECTION INTRAMUSCULAR; INTRAVENOUS at 20:52

## 2023-10-19 RX ADMIN — CHLORDIAZEPOXIDE HYDROCHLORIDE 25 MG: 25 CAPSULE ORAL at 10:20

## 2023-10-19 RX ADMIN — ESCITALOPRAM OXALATE 20 MG: 10 TABLET ORAL at 06:08

## 2023-10-19 RX ADMIN — THERA TABS 1 TABLET: TAB at 06:08

## 2023-10-19 RX ADMIN — ONDANSETRON 4 MG: 2 INJECTION INTRAMUSCULAR; INTRAVENOUS at 01:41

## 2023-10-19 RX ADMIN — CHLORDIAZEPOXIDE HYDROCHLORIDE 25 MG: 25 CAPSULE ORAL at 13:31

## 2023-10-19 RX ADMIN — CHLORDIAZEPOXIDE HYDROCHLORIDE 25 MG: 25 CAPSULE ORAL at 22:53

## 2023-10-19 ASSESSMENT — PAIN DESCRIPTION - PAIN TYPE
TYPE: ACUTE PAIN

## 2023-10-19 ASSESSMENT — COGNITIVE AND FUNCTIONAL STATUS - GENERAL
MOVING TO AND FROM BED TO CHAIR: A LOT
MOBILITY SCORE: 9
SUGGESTED CMS G CODE MODIFIER MOBILITY: CM
WALKING IN HOSPITAL ROOM: TOTAL
MOVING FROM LYING ON BACK TO SITTING ON SIDE OF FLAT BED: UNABLE
TURNING FROM BACK TO SIDE WHILE IN FLAT BAD: A LOT
CLIMB 3 TO 5 STEPS WITH RAILING: TOTAL
STANDING UP FROM CHAIR USING ARMS: A LOT

## 2023-10-19 ASSESSMENT — FIBROSIS 4 INDEX: FIB4 SCORE: 2.08

## 2023-10-19 ASSESSMENT — ENCOUNTER SYMPTOMS
GASTROINTESTINAL NEGATIVE: 1
HEADACHES: 1
MYALGIAS: 1
CARDIOVASCULAR NEGATIVE: 1
EYES NEGATIVE: 1
RESPIRATORY NEGATIVE: 1
MEMORY LOSS: 1
WEAKNESS: 1

## 2023-10-19 ASSESSMENT — GAIT ASSESSMENTS: GAIT LEVEL OF ASSIST: UNABLE TO PARTICIPATE
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
## 2023-10-19 NOTE — DISCHARGE PLANNING
Case Management Discharge Planning    Admission Date: 10/15/2023  GMLOS: 3.4  ALOS: 3    6-Clicks ADL Score: 20  6-Clicks Mobility Score: 17  PT and/or OT Eval ordered: No  Post-acute Referrals Ordered: No  Post-acute Choice Obtained: NA  Has referral(s) been sent to post-acute provider:  HUYEN      Anticipated Discharge Dispo: Discharge Disposition: Still a Patient (30)    DME Needed: No    Action(s) Taken: Updated Provider/Nurse on Discharge Plan    Escalations Completed: None    Medically Clear: No    Next Steps: Pt discussed in rounds. Pt on a legal hold and has a sitter. Pt is in restraints for pulling lines. Pt more awake. Pt is alert and orientated to person and place. Judgement still not clear. Pt not yet medically clear but stable to transfer out of ICU.     Pt's legal hold extended and can be transferred to a psychiatric hospital when medically cleared and bed is available. Psychiatry is following. Pt is allowed to use hospital phone and can speak to family only (not wife). Visitors are also allowed: family only (not wife).     Barriers to Discharge: Medical Clearance and pt on a legal hold.    Is the patient up for discharge tomorrow: No

## 2023-10-19 NOTE — CARE PLAN
The patient is Stable - Low risk of patient condition declining or worsening    Shift Goals  Clinical Goals: Safety, manage precedex gtt, etoh withdrawal management  Patient Goals: LOAN  Family Goals: LOAN    Progress made toward(s) clinical / shift goals:    Problem: Safety - Violent/Self-destructive Restraint  Goal: Remains free of injury from restraints (Restraint for Violent/Self-Destructive Behavior)  Outcome: Progressing     Problem: Pain - Standard  Goal: Alleviation of pain or a reduction in pain to the patient’s comfort goal  Outcome: Progressing     Problem: Knowledge Deficit - Standard  Goal: Patient and family/care givers will demonstrate understanding of plan of care, disease process/condition, diagnostic tests and medications  Outcome: Progressing     Problem: Optimal Care for Alcohol Withdrawal  Goal: Optimal Care for the alcohol withdrawal patient  Outcome: Progressing       Patient is not progressing towards the following goals:      Problem: Safety - Violent/Self-destructive Restraint  Goal: Free from restraints (Restraint for Violent or Self-Destructive Behavior)  Outcome: Not Progressing

## 2023-10-19 NOTE — DISCHARGE PLANNING
Medical Social Work    This writer was notified that someone from a [employer] service for an 's office was here to serve the pt with divorce papers. [facility] informed the messenger, Angella, that the pt is not orientated or in a condition to understand what is happening.     Contact information of [employer] services was obtained:   Angella [phone] (cell) or [phone] (office).
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
## 2023-10-19 NOTE — CARE PLAN
Problem: Provide Safe Environment  Goal: Suicide environmental safety, protocols, policies, and practices will be implemented  Outcome: Progressing     Problem: Safety - Medical Restraint  Goal: Remains free of injury from restraints (Restraint for Interference with Medical Device)  Outcome: Progressing   The patient is Stable - Low risk of patient condition declining or worsening    Shift Goals  Clinical Goals: Safety, manage precedex gtt, etoh withdrawal management  Patient Goals: get out of hospital    Family Goals: LOAN    Progress made toward(s) clinical / shift goals:      Patient is not progressing towards the following goals:
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
## 2023-10-20 PROCEDURE — A9270 NON-COVERED ITEM OR SERVICE: HCPCS | Performed by: INTERNAL MEDICINE

## 2023-10-20 PROCEDURE — 700102 HCHG RX REV CODE 250 W/ 637 OVERRIDE(OP): Performed by: INTERNAL MEDICINE

## 2023-10-20 PROCEDURE — A9270 NON-COVERED ITEM OR SERVICE: HCPCS | Performed by: STUDENT IN AN ORGANIZED HEALTH CARE EDUCATION/TRAINING PROGRAM

## 2023-10-20 PROCEDURE — 99232 SBSQ HOSP IP/OBS MODERATE 35: CPT | Performed by: HOSPITALIST

## 2023-10-20 PROCEDURE — 770001 HCHG ROOM/CARE - MED/SURG/GYN PRIV*

## 2023-10-20 PROCEDURE — A9270 NON-COVERED ITEM OR SERVICE: HCPCS | Performed by: HOSPITALIST

## 2023-10-20 PROCEDURE — 700102 HCHG RX REV CODE 250 W/ 637 OVERRIDE(OP): Performed by: HOSPITALIST

## 2023-10-20 PROCEDURE — 700102 HCHG RX REV CODE 250 W/ 637 OVERRIDE(OP): Performed by: STUDENT IN AN ORGANIZED HEALTH CARE EDUCATION/TRAINING PROGRAM

## 2023-10-20 PROCEDURE — 700111 HCHG RX REV CODE 636 W/ 250 OVERRIDE (IP): Performed by: INTERNAL MEDICINE

## 2023-10-20 PROCEDURE — 99232 SBSQ HOSP IP/OBS MODERATE 35: CPT | Mod: GC | Performed by: PSYCHIATRY & NEUROLOGY

## 2023-10-20 PROCEDURE — 700105 HCHG RX REV CODE 258: Performed by: INTERNAL MEDICINE

## 2023-10-20 RX ADMIN — VERAPAMIL HYDROCHLORIDE 240 MG: 240 TABLET, FILM COATED, EXTENDED RELEASE ORAL at 05:50

## 2023-10-20 RX ADMIN — RIVAROXABAN 10 MG: 10 TABLET, FILM COATED ORAL at 17:23

## 2023-10-20 RX ADMIN — THERA TABS 1 TABLET: TAB at 05:50

## 2023-10-20 RX ADMIN — LORAZEPAM 4 MG: 2 INJECTION INTRAMUSCULAR; INTRAVENOUS at 00:37

## 2023-10-20 RX ADMIN — LABETALOL HYDROCHLORIDE 10 MG: 5 INJECTION INTRAVENOUS at 01:43

## 2023-10-20 RX ADMIN — LORAZEPAM 4 MG: 2 INJECTION INTRAMUSCULAR; INTRAVENOUS at 17:23

## 2023-10-20 RX ADMIN — LABETALOL HYDROCHLORIDE 20 MG: 5 INJECTION INTRAVENOUS at 16:34

## 2023-10-20 RX ADMIN — LORAZEPAM 4 MG: 2 INJECTION INTRAMUSCULAR; INTRAVENOUS at 20:51

## 2023-10-20 RX ADMIN — LABETALOL HYDROCHLORIDE 10 MG: 5 INJECTION INTRAVENOUS at 12:34

## 2023-10-20 RX ADMIN — FOLIC ACID 1 MG: 1 TABLET ORAL at 05:51

## 2023-10-20 RX ADMIN — CHLORDIAZEPOXIDE HYDROCHLORIDE 25 MG: 25 CAPSULE ORAL at 13:16

## 2023-10-20 RX ADMIN — THIAMINE HYDROCHLORIDE 500 MG: 100 INJECTION, SOLUTION INTRAMUSCULAR; INTRAVENOUS at 05:43

## 2023-10-20 RX ADMIN — ESCITALOPRAM OXALATE 20 MG: 10 TABLET ORAL at 05:50

## 2023-10-20 RX ADMIN — CHLORDIAZEPOXIDE HYDROCHLORIDE 25 MG: 25 CAPSULE ORAL at 05:51

## 2023-10-20 RX ADMIN — CHLORDIAZEPOXIDE HYDROCHLORIDE 25 MG: 25 CAPSULE ORAL at 22:22

## 2023-10-20 RX ADMIN — HYDRALAZINE HYDROCHLORIDE 20 MG: 20 INJECTION, SOLUTION INTRAMUSCULAR; INTRAVENOUS at 18:41

## 2023-10-20 ASSESSMENT — COGNITIVE AND FUNCTIONAL STATUS - GENERAL
MOVING FROM LYING ON BACK TO SITTING ON SIDE OF FLAT BED: UNABLE
SUGGESTED CMS G CODE MODIFIER DAILY ACTIVITY: CK
PERSONAL GROOMING: A LITTLE
HELP NEEDED FOR BATHING: A LOT
MOVING TO AND FROM BED TO CHAIR: A LOT
SUGGESTED CMS G CODE MODIFIER MOBILITY: CM
TOILETING: TOTAL
WALKING IN HOSPITAL ROOM: TOTAL
DAILY ACTIVITIY SCORE: 14
CLIMB 3 TO 5 STEPS WITH RAILING: TOTAL
EATING MEALS: A LITTLE
TURNING FROM BACK TO SIDE WHILE IN FLAT BAD: A LOT
DRESSING REGULAR LOWER BODY CLOTHING: A LITTLE
MOBILITY SCORE: 9
STANDING UP FROM CHAIR USING ARMS: A LOT
DRESSING REGULAR UPPER BODY CLOTHING: A LOT

## 2023-10-20 ASSESSMENT — FIBROSIS 4 INDEX
FIB4 SCORE: 2.08
FIB4 SCORE: 2.08

## 2023-10-20 ASSESSMENT — PAIN DESCRIPTION - PAIN TYPE
TYPE: ACUTE PAIN
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
## 2023-10-20 NOTE — CARE PLAN
The patient is Watcher - Medium risk of patient condition declining or worsening    Shift Goals  Clinical Goals: decreased confusion/agitation  Patient Goals: LOAN  Family Goals: LOAN    Progress made toward(s) clinical / shift goals:    Problem: Safety - Violent/Self-destructive Restraint  Goal: Remains free of injury from restraints (Restraint for Violent/Self-Destructive Behavior)  Outcome: Progressing  Goal: Free from restraints (Restraint for Violent or Self-Destructive Behavior)  Outcome: Progressing     Problem: Pain - Standard  Goal: Alleviation of pain or a reduction in pain to the patient’s comfort goal  Outcome: Progressing     Problem: Fall Risk  Goal: Patient will remain free from falls  Outcome: Progressing     Problem: Skin Integrity  Goal: Skin integrity is maintained or improved  Outcome: Progressing

## 2023-10-20 NOTE — THERAPY
Occupational Therapy Contact Note    Patient Name: Colt Segovia  Age:  48 y.o., Sex:  male  Medical Record #: [id number]  Today's Date: 10/20/2023    Discussed missed therapy with RN        10/20/23 1150   Interdisciplinary Plan of Care Collaboration   Collaboration Comments OT eval attempted per RN pt agitated and needing to be cleaned up will check back later as able/appropriate   Session Information   Date / Session Number  10/20 attempted  (needs eval)
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
## 2023-10-21 PROBLEM — E87.1 HYPONATREMIA: Status: ACTIVE | Noted: 2023-10-21

## 2023-10-21 PROBLEM — F32.9 MDD (MAJOR DEPRESSIVE DISORDER): Status: ACTIVE | Noted: 2023-10-21

## 2023-10-21 LAB
ALBUMIN SERPL BCP-MCNC: 4 G/DL (ref 3.2–4.9)
ALBUMIN/GLOB SERPL: 1.1 G/DL
ALP SERPL-CCNC: 74 U/L (ref 30–99)
ALT SERPL-CCNC: 23 U/L (ref 2–50)
ANION GAP SERPL CALC-SCNC: 11 MMOL/L (ref 7–16)
AST SERPL-CCNC: 29 U/L (ref 12–45)
BILIRUB SERPL-MCNC: 1 MG/DL (ref 0.1–1.5)
BUN SERPL-MCNC: 18 MG/DL (ref 8–22)
CALCIUM ALBUM COR SERPL-MCNC: 9.3 MG/DL (ref 8.5–10.5)
CALCIUM SERPL-MCNC: 9.3 MG/DL (ref 8.5–10.5)
CHLORIDE SERPL-SCNC: 97 MMOL/L (ref 96–112)
CO2 SERPL-SCNC: 28 MMOL/L (ref 20–33)
CREAT SERPL-MCNC: 0.9 MG/DL (ref 0.5–1.4)
ERYTHROCYTE [DISTWIDTH] IN BLOOD BY AUTOMATED COUNT: 47.4 FL (ref 35.9–50)
GFR SERPLBLD CREATININE-BSD FMLA CKD-EPI: 105 ML/MIN/1.73 M 2
GLOBULIN SER CALC-MCNC: 3.6 G/DL (ref 1.9–3.5)
GLUCOSE SERPL-MCNC: 126 MG/DL (ref 65–99)
HCT VFR BLD AUTO: 46.2 % (ref 42–52)
HGB BLD-MCNC: 15 G/DL (ref 14–18)
INR PPP: 1.08 (ref 0.87–1.13)
MCH RBC QN AUTO: 34.1 PG (ref 27–33)
MCHC RBC AUTO-ENTMCNC: 32.5 G/DL (ref 32.3–36.5)
MCV RBC AUTO: 105 FL (ref 81.4–97.8)
PLATELET # BLD AUTO: 225 K/UL (ref 164–446)
PMV BLD AUTO: 10.4 FL (ref 9–12.9)
POTASSIUM SERPL-SCNC: 3.7 MMOL/L (ref 3.6–5.5)
PROT SERPL-MCNC: 7.6 G/DL (ref 6–8.2)
PROTHROMBIN TIME: 14.2 SEC (ref 12–14.6)
RBC # BLD AUTO: 4.4 M/UL (ref 4.7–6.1)
SODIUM SERPL-SCNC: 136 MMOL/L (ref 135–145)
VIT B1 BLD-MCNC: 155 NMOL/L (ref 70–180)
WBC # BLD AUTO: 8.4 K/UL (ref 4.8–10.8)

## 2023-10-21 PROCEDURE — 85027 COMPLETE CBC AUTOMATED: CPT

## 2023-10-21 PROCEDURE — 770001 HCHG ROOM/CARE - MED/SURG/GYN PRIV*

## 2023-10-21 PROCEDURE — A9270 NON-COVERED ITEM OR SERVICE: HCPCS | Performed by: INTERNAL MEDICINE

## 2023-10-21 PROCEDURE — 700102 HCHG RX REV CODE 250 W/ 637 OVERRIDE(OP): Performed by: STUDENT IN AN ORGANIZED HEALTH CARE EDUCATION/TRAINING PROGRAM

## 2023-10-21 PROCEDURE — 700102 HCHG RX REV CODE 250 W/ 637 OVERRIDE(OP): Performed by: INTERNAL MEDICINE

## 2023-10-21 PROCEDURE — 85610 PROTHROMBIN TIME: CPT

## 2023-10-21 PROCEDURE — 80053 COMPREHEN METABOLIC PANEL: CPT

## 2023-10-21 PROCEDURE — 700111 HCHG RX REV CODE 636 W/ 250 OVERRIDE (IP): Performed by: INTERNAL MEDICINE

## 2023-10-21 PROCEDURE — 99233 SBSQ HOSP IP/OBS HIGH 50: CPT | Performed by: INTERNAL MEDICINE

## 2023-10-21 PROCEDURE — A9270 NON-COVERED ITEM OR SERVICE: HCPCS | Performed by: HOSPITALIST

## 2023-10-21 PROCEDURE — A9270 NON-COVERED ITEM OR SERVICE: HCPCS | Performed by: STUDENT IN AN ORGANIZED HEALTH CARE EDUCATION/TRAINING PROGRAM

## 2023-10-21 PROCEDURE — 36415 COLL VENOUS BLD VENIPUNCTURE: CPT

## 2023-10-21 PROCEDURE — 700102 HCHG RX REV CODE 250 W/ 637 OVERRIDE(OP): Performed by: HOSPITALIST

## 2023-10-21 RX ORDER — CHLORDIAZEPOXIDE HYDROCHLORIDE 25 MG/1
25 CAPSULE, GELATIN COATED ORAL 2 TIMES DAILY
Status: COMPLETED | OUTPATIENT
Start: 2023-10-23 | End: 2023-10-25

## 2023-10-21 RX ORDER — CHLORDIAZEPOXIDE HYDROCHLORIDE 25 MG/1
25 CAPSULE, GELATIN COATED ORAL EVERY 8 HOURS
Status: COMPLETED | OUTPATIENT
Start: 2023-10-21 | End: 2023-10-23

## 2023-10-21 RX ORDER — CHLORDIAZEPOXIDE HYDROCHLORIDE 25 MG/1
25 CAPSULE, GELATIN COATED ORAL DAILY
Status: DISCONTINUED | OUTPATIENT
Start: 2023-10-26 | End: 2023-10-25 | Stop reason: HOSPADM

## 2023-10-21 RX ADMIN — CHLORDIAZEPOXIDE HYDROCHLORIDE 25 MG: 25 CAPSULE ORAL at 05:10

## 2023-10-21 RX ADMIN — RIVAROXABAN 10 MG: 10 TABLET, FILM COATED ORAL at 16:09

## 2023-10-21 RX ADMIN — LORAZEPAM 4 MG: 2 INJECTION INTRAMUSCULAR; INTRAVENOUS at 02:33

## 2023-10-21 RX ADMIN — LORAZEPAM 4 MG: 2 INJECTION INTRAMUSCULAR; INTRAVENOUS at 21:47

## 2023-10-21 RX ADMIN — LORAZEPAM 4 MG: 2 INJECTION INTRAMUSCULAR; INTRAVENOUS at 17:22

## 2023-10-21 RX ADMIN — ESCITALOPRAM OXALATE 20 MG: 10 TABLET ORAL at 05:10

## 2023-10-21 RX ADMIN — LORAZEPAM 4 MG: 2 INJECTION INTRAMUSCULAR; INTRAVENOUS at 12:16

## 2023-10-21 RX ADMIN — VERAPAMIL HYDROCHLORIDE 240 MG: 240 TABLET, FILM COATED, EXTENDED RELEASE ORAL at 05:10

## 2023-10-21 RX ADMIN — CHLORDIAZEPOXIDE HYDROCHLORIDE 25 MG: 25 CAPSULE ORAL at 13:48

## 2023-10-21 RX ADMIN — Medication 100 MG: at 05:10

## 2023-10-21 RX ADMIN — CHLORDIAZEPOXIDE HYDROCHLORIDE 25 MG: 25 CAPSULE ORAL at 20:36

## 2023-10-21 ASSESSMENT — FIBROSIS 4 INDEX: FIB4 SCORE: 1.29

## 2023-10-21 ASSESSMENT — PAIN DESCRIPTION - PAIN TYPE: TYPE: ACUTE PAIN

## 2023-10-21 NOTE — CARE PLAN
The patient is Watcher - Medium risk of patient condition declining or worsening    Shift Goals  Clinical Goals: transfer to floor, reorientation  Patient Goals: LOAN  Family Goals: Updates via phone    Progress made toward(s) clinical / shift goals:   Patient does have a Sitter , Ativan given for agitation, restless and confusion.    Patient is not progressing towards the following goals:      Problem: Knowledge Deficit - Standard  Goal: Patient and family/care givers will demonstrate understanding of plan of care, disease process/condition, diagnostic tests and medications  Outcome: Not Met     Problem: Psychosocial  Goal: Patient's level of anxiety will decrease  Outcome: Not Met     Problem: Psychosocial  Goal: Patient's ability to identify and develop effective coping behaviors will improve  Outcome: Not Met  Goal: Patient's ability to identify and utilize available support systems will improve  Outcome: Not Met
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
## 2023-10-21 NOTE — PROGRESS NOTES
"Patient had asked if he could go to the hospital [facility] outside, via wheelchair. Reach out to Jairo MIRELES, Psychiatry, if this would be ok due to the legal hold. Activity was approved. Patient was physically unable to transfer out of bed. Patient barely able to move a few inches, with out grimacing from pain. Encouraged patient that getting out of bed into the wheel chair is unsafe for his condition at this time. Patient agreeable to staying in bed, be appeared disappointed asked, \"how long will this last\" in reference to the pain. Patient repositioned in bed after which patient closed eyes, appearing to rest/fall asleep, no longer answering questions about pain.    "

## 2023-10-21 NOTE — ASSESSMENT & PLAN NOTE
Repeat TBILI is normal  Repeat INR is normal  Likely a spurious result or an element of Gilbert's syndrome  I personally reviewed each of these labs on 10/22

## 2023-10-21 NOTE — PROGRESS NOTES
4 Eyes Skin Assessment Completed by VIMAL Fay and VIMAL Murray.    Head Bruising, Abrasion  Ears Redness  Nose Blanching.Redness  Mouth Redness ,abrasion  Neck WDL  Breast/Chest WDL  Shoulder Blades WDL  Spine WDL  (R) Arm/Elbow/Hand Bruising  (L) Arm/Elbow/Hand Bruising  Abdomen WDL  Groin WDL  Scrotum/Coccyx/Buttocks Redness and Blanching  (R) Leg WDL  (L) Leg WDL  (R) Heel/Foot/Toe Bruising,swelling  (L) Heel/Foot/Toe WDL          Devices In Places PIV      Interventions In Place N/A    Possible Skin Injury No    Pictures Uploaded Into Epic N/A  Wound Consult Placed N/A  RN Wound Prevention Protocol Ordered No

## 2023-10-21 NOTE — PROGRESS NOTES
Report provided to transport tech prior to transport, sitter at bedside. Medicated patient per MAR prior to transfer to [place] [id number].

## 2023-10-21 NOTE — PROGRESS NOTES
Legal hold safety precautions were put into place 10/19.     Precautions communicated to sitter and/or CNA sitter. Patient placed in ligature precautions, all items removed from the room, patient in paper scrubs.

## 2023-10-21 NOTE — PROGRESS NOTES
Vilas some one yelling.  This RN went into room and pt took off pants and he urinated in bed.  Tried to change linen but pt is uncooperative.  Agitated.  Confused.  Pt keep saying that he will get up and go home.  RN explained to pt that he is in hospital.  Prn ativan given.  Cleaned and linen changed with security guards assistance.
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
## 2023-10-21 NOTE — CARE PLAN
The patient is Stable - Low risk of patient condition declining or worsening    Shift Goals  Clinical Goals: improve neuro exams, monitor for s/s of etoh withdrawal, transfer out of ICU  Patient Goals: LOAN  Family Goals: No family at bedside    Progress made toward(s) clinical / shift goals:    Problem: Safety - Violent/Self-destructive Restraint  Goal: Remains free of injury from restraints (Restraint for Violent/Self-Destructive Behavior)  Outcome: Progressing  Goal: Free from restraints (Restraint for Violent or Self-Destructive Behavior)  Outcome: Progressing     Problem: Pain - Standard  Goal: Alleviation of pain or a reduction in pain to the patient’s comfort goal  Outcome: Progressing     Problem: Optimal Care for Alcohol Withdrawal  Goal: Optimal Care for the alcohol withdrawal patient  Outcome: Progressing     Problem: Seizure Precautions  Goal: Implementation of seizure precautions  Outcome: Progressing       Patient is not progressing towards the following goals:

## 2023-10-22 PROCEDURE — 700102 HCHG RX REV CODE 250 W/ 637 OVERRIDE(OP): Performed by: INTERNAL MEDICINE

## 2023-10-22 PROCEDURE — A9270 NON-COVERED ITEM OR SERVICE: HCPCS | Performed by: INTERNAL MEDICINE

## 2023-10-22 PROCEDURE — 700102 HCHG RX REV CODE 250 W/ 637 OVERRIDE(OP): Performed by: STUDENT IN AN ORGANIZED HEALTH CARE EDUCATION/TRAINING PROGRAM

## 2023-10-22 PROCEDURE — 99232 SBSQ HOSP IP/OBS MODERATE 35: CPT | Performed by: INTERNAL MEDICINE

## 2023-10-22 PROCEDURE — 770001 HCHG ROOM/CARE - MED/SURG/GYN PRIV*

## 2023-10-22 PROCEDURE — A9270 NON-COVERED ITEM OR SERVICE: HCPCS | Performed by: HOSPITALIST

## 2023-10-22 PROCEDURE — A9270 NON-COVERED ITEM OR SERVICE: HCPCS | Performed by: STUDENT IN AN ORGANIZED HEALTH CARE EDUCATION/TRAINING PROGRAM

## 2023-10-22 PROCEDURE — 700102 HCHG RX REV CODE 250 W/ 637 OVERRIDE(OP): Performed by: HOSPITALIST

## 2023-10-22 RX ORDER — LORAZEPAM 2 MG/ML
2 INJECTION INTRAMUSCULAR
Status: DISCONTINUED | OUTPATIENT
Start: 2023-10-22 | End: 2023-10-25 | Stop reason: HOSPADM

## 2023-10-22 RX ADMIN — CHLORDIAZEPOXIDE HYDROCHLORIDE 25 MG: 25 CAPSULE ORAL at 14:22

## 2023-10-22 RX ADMIN — ESCITALOPRAM OXALATE 20 MG: 10 TABLET ORAL at 05:13

## 2023-10-22 RX ADMIN — CHLORDIAZEPOXIDE HYDROCHLORIDE 25 MG: 25 CAPSULE ORAL at 22:53

## 2023-10-22 RX ADMIN — VERAPAMIL HYDROCHLORIDE 240 MG: 240 TABLET, FILM COATED, EXTENDED RELEASE ORAL at 05:13

## 2023-10-22 RX ADMIN — ACETAMINOPHEN 650 MG: 325 TABLET, FILM COATED ORAL at 17:35

## 2023-10-22 RX ADMIN — CHLORDIAZEPOXIDE HYDROCHLORIDE 25 MG: 25 CAPSULE ORAL at 05:12

## 2023-10-22 RX ADMIN — RIVAROXABAN 10 MG: 10 TABLET, FILM COATED ORAL at 17:35

## 2023-10-22 RX ADMIN — ACETAMINOPHEN 650 MG: 325 TABLET, FILM COATED ORAL at 08:53

## 2023-10-22 RX ADMIN — Medication 100 MG: at 05:12

## 2023-10-22 ASSESSMENT — ENCOUNTER SYMPTOMS
ABDOMINAL PAIN: 0
DEPRESSION: 1
FEVER: 0
NAUSEA: 0
BACK PAIN: 1
MYALGIAS: 1
VOMITING: 0
CHILLS: 0

## 2023-10-22 ASSESSMENT — LIFESTYLE VARIABLES: SUBSTANCE_ABUSE: 1

## 2023-10-22 NOTE — PROGRESS NOTES
Pt asked to go to bathroom.  RN offered urinal but pt unable to urinate in urinal.  Pt even sat edge of bed but unable to urinate.  Then, pt said he will get up and go home.  RN explained to pt about legal hold and back fracture but pt was adamant.  Security guards called for assist.  Pt assisted to lay down in bed.  At first, pt refused to eat dinner but after encouragement, pt ate little bit with assist.  Good PO fluid intake.  One to one sitter at bedside.  Bed alarm on.

## 2023-10-22 NOTE — CARE PLAN
Patient A&O x1 during shift, off and on anxiety and agitation. Patient wanting to get out of bed, unable due to weakness and pain. Patient incontinent of urine during shift. Sitter 1:1, no oral intake for full meals or water. Medications floated of apple sauce takes a few swallows for patient to get meds down. Patient able to follow commands, but need to be repeated. Mostly lethargic appearing through out shift.    The patient is Stable - Low risk of patient condition declining or worsening    Shift Goals  Clinical Goals: patient will remain physically safe during this shift, no falls, no self infliction  Patient Goals: LOAN  Family Goals: LOAN    Progress made toward(s) clinical / shift goals:  pt remained physically safe during this shift, no falls, no self infliction        Problem: Safety - Violent/Self-destructive Restraint  Goal: Free from restraints (Restraint for Violent or Self-Destructive Behavior)  Outcome: Progressing     Problem: Seizure Precautions  Goal: Implementation of seizure precautions  Outcome: Progressing     Problem: Risk for Aspiration  Goal: Patient's risk for aspiration will be absent or decrease  Outcome: Progressing     Problem: Fall Risk  Goal: Patient will remain free from falls  Outcome: Progressing     Problem: Safety - Medical Restraint  Goal: Remains free of injury from restraints (Restraint for Interference with Medical Device)  Outcome: Progressing     Problem: Safety - Medical Restraint  Goal: Free from restraint(s) (Restraint for Interference with Medical Device)  Outcome: Progressing     Problem: Skin Integrity  Goal: Skin integrity is maintained or improved  Outcome: Progressing       Patient is not progressing towards the following goals:unable to educate patient, due to LOC, pt unable to express pain, other then moaning when moving.      Problem: Safety - Violent/Self-destructive Restraint  Goal: Remains free of injury from restraints (Restraint for Violent/Self-Destructive  Behavior)  Outcome: Not Met     Problem: Pain - Standard  Goal: Alleviation of pain or a reduction in pain to the patient’s comfort goal  Outcome: Not Met     Problem: Knowledge Deficit - Standard  Goal: Patient and family/care givers will demonstrate understanding of plan of care, disease process/condition, diagnostic tests and medications  Outcome: Not Met     Problem: Optimal Care for Alcohol Withdrawal  Goal: Optimal Care for the alcohol withdrawal patient  Outcome: Not Met     Problem: Lifestyle Changes  Goal: Patient's ability to identify lifestyle changes and available resources to help reduce recurrence of condition will improve  Outcome: Not Met     Problem: Psychosocial  Goal: Patient's level of anxiety will decrease  Outcome: Not Met  Goal: Spiritual and cultural needs incorporated into hospitalization  Outcome: Not Met     Problem: Psychosocial  Goal: Patient's ability to identify and develop effective coping behaviors will improve  Outcome: Not Met  Goal: Patient's ability to identify and utilize available support systems will improve  Outcome: Not Met

## 2023-10-22 NOTE — PROGRESS NOTES
Pt is more alert this AM.  Pt removed condom cath.  Pt agreed to have another condom cath.  Pt refused to have blanket or pants on so incontinent brief applied so his private part is covered.

## 2023-10-22 NOTE — CARE PLAN
The patient is Watcher - Medium risk of patient condition declining or worsening    Shift Goals  Clinical Goals: Patient will remain safe ;no episode of fall T/O this shift  Patient Goals: LOAN  Family Goals: LOAN    Progress made toward(s) clinical / shift goals:  Patient still confuse and agitated and wants to go home.PRN meds given. See MAR. Re-orient and educated patient to reality. Bed alarm set.    Patient is not progressing towards the following goals:    Problem: Knowledge Deficit - Standard  Goal: Patient and family/care givers will demonstrate understanding of plan of care, disease process/condition, diagnostic tests and medications  Outcome: Not Met

## 2023-10-23 ENCOUNTER — APPOINTMENT (OUTPATIENT)
Dept: RADIOLOGY | Facility: MEDICAL CENTER | Age: 48
DRG: 894 | End: 2023-10-23
Attending: INTERNAL MEDICINE
Payer: COMMERCIAL

## 2023-10-23 PROBLEM — M25.561 RIGHT KNEE PAIN: Status: ACTIVE | Noted: 2023-10-23

## 2023-10-23 PROCEDURE — 73700 CT LOWER EXTREMITY W/O DYE: CPT | Mod: RT

## 2023-10-23 PROCEDURE — 700102 HCHG RX REV CODE 250 W/ 637 OVERRIDE(OP): Performed by: STUDENT IN AN ORGANIZED HEALTH CARE EDUCATION/TRAINING PROGRAM

## 2023-10-23 PROCEDURE — A9270 NON-COVERED ITEM OR SERVICE: HCPCS | Performed by: STUDENT IN AN ORGANIZED HEALTH CARE EDUCATION/TRAINING PROGRAM

## 2023-10-23 PROCEDURE — 700102 HCHG RX REV CODE 250 W/ 637 OVERRIDE(OP): Performed by: INTERNAL MEDICINE

## 2023-10-23 PROCEDURE — 99232 SBSQ HOSP IP/OBS MODERATE 35: CPT | Mod: GC | Performed by: PSYCHIATRY & NEUROLOGY

## 2023-10-23 PROCEDURE — 770001 HCHG ROOM/CARE - MED/SURG/GYN PRIV*

## 2023-10-23 PROCEDURE — A9270 NON-COVERED ITEM OR SERVICE: HCPCS

## 2023-10-23 PROCEDURE — 700102 HCHG RX REV CODE 250 W/ 637 OVERRIDE(OP): Performed by: HOSPITALIST

## 2023-10-23 PROCEDURE — A9270 NON-COVERED ITEM OR SERVICE: HCPCS | Performed by: INTERNAL MEDICINE

## 2023-10-23 PROCEDURE — 99232 SBSQ HOSP IP/OBS MODERATE 35: CPT | Performed by: INTERNAL MEDICINE

## 2023-10-23 PROCEDURE — 700102 HCHG RX REV CODE 250 W/ 637 OVERRIDE(OP)

## 2023-10-23 PROCEDURE — A9270 NON-COVERED ITEM OR SERVICE: HCPCS | Performed by: HOSPITALIST

## 2023-10-23 RX ORDER — IBUPROFEN 400 MG/1
400 TABLET ORAL EVERY 6 HOURS PRN
Status: DISCONTINUED | OUTPATIENT
Start: 2023-10-23 | End: 2023-10-25 | Stop reason: HOSPADM

## 2023-10-23 RX ORDER — FOLIC ACID 1 MG/1
1 TABLET ORAL DAILY
Status: DISCONTINUED | OUTPATIENT
Start: 2023-10-23 | End: 2023-10-25 | Stop reason: HOSPADM

## 2023-10-23 RX ORDER — NALTREXONE HYDROCHLORIDE 50 MG/1
50 TABLET, FILM COATED ORAL DAILY
Status: DISCONTINUED | OUTPATIENT
Start: 2023-10-23 | End: 2023-10-25 | Stop reason: HOSPADM

## 2023-10-23 RX ADMIN — RIVAROXABAN 10 MG: 10 TABLET, FILM COATED ORAL at 17:28

## 2023-10-23 RX ADMIN — ESCITALOPRAM OXALATE 20 MG: 10 TABLET ORAL at 04:38

## 2023-10-23 RX ADMIN — IBUPROFEN 400 MG: 400 TABLET, FILM COATED ORAL at 20:49

## 2023-10-23 RX ADMIN — Medication 100 MG: at 04:38

## 2023-10-23 RX ADMIN — IBUPROFEN 400 MG: 400 TABLET, FILM COATED ORAL at 04:37

## 2023-10-23 RX ADMIN — CHLORDIAZEPOXIDE HYDROCHLORIDE 25 MG: 25 CAPSULE ORAL at 17:29

## 2023-10-23 RX ADMIN — NALTREXONE HYDROCHLORIDE 50 MG: 50 TABLET, FILM COATED ORAL at 20:50

## 2023-10-23 RX ADMIN — VERAPAMIL HYDROCHLORIDE 240 MG: 240 TABLET, FILM COATED, EXTENDED RELEASE ORAL at 04:38

## 2023-10-23 RX ADMIN — CHLORDIAZEPOXIDE HYDROCHLORIDE 25 MG: 25 CAPSULE ORAL at 04:38

## 2023-10-23 RX ADMIN — FOLIC ACID 1 MG: 1 TABLET ORAL at 20:49

## 2023-10-23 ASSESSMENT — ENCOUNTER SYMPTOMS
CHILLS: 0
FEVER: 0
RESPIRATORY NEGATIVE: 1
EYES NEGATIVE: 1
CARDIOVASCULAR NEGATIVE: 1
NEUROLOGICAL NEGATIVE: 1
VOMITING: 0
GASTROINTESTINAL NEGATIVE: 1
DEPRESSION: 1
CONSTITUTIONAL NEGATIVE: 1
BACK PAIN: 0
MYALGIAS: 1
NAUSEA: 0
ABDOMINAL PAIN: 0

## 2023-10-23 ASSESSMENT — LIFESTYLE VARIABLES: SUBSTANCE_ABUSE: 1
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
## 2023-10-23 NOTE — CARE PLAN
Pt alert, disoriented to time. Intermittent confusion noted. 1:1 observation still indicated for pt safety. Legal hold explained to pt. Items removed from room per policy. VSS. No acute events or severe agitation noted.    Problem: Safety - Violent/Self-destructive Restraint  Goal: Remains free of injury from restraints (Restraint for Violent/Self-Destructive Behavior)  Outcome: Progressing  Goal: Free from restraints (Restraint for Violent or Self-Destructive Behavior)  Outcome: Progressing     Problem: Pain - Standard  Goal: Alleviation of pain or a reduction in pain to the patient’s comfort goal  Outcome: Progressing     Problem: Knowledge Deficit - Standard  Goal: Patient and family/care givers will demonstrate understanding of plan of care, disease process/condition, diagnostic tests and medications  Outcome: Progressing     Problem: Optimal Care for Alcohol Withdrawal  Goal: Optimal Care for the alcohol withdrawal patient  Outcome: Progressing     Problem: Seizure Precautions  Goal: Implementation of seizure precautions  Outcome: Progressing     Problem: Lifestyle Changes  Goal: Patient's ability to identify lifestyle changes and available resources to help reduce recurrence of condition will improve  Outcome: Progressing     Problem: Psychosocial  Goal: Patient's level of anxiety will decrease  Outcome: Progressing  Goal: Spiritual and cultural needs incorporated into hospitalization  Outcome: Progressing    Problem: Psychosocial  Goal: Patient's ability to identify and develop effective coping behaviors will improve  Outcome: Progressing  Goal: Patient's ability to identify and utilize available support systems will improve  Outcome: Progressing     Problem: Fall Risk  Goal: Patient will remain free from falls  Outcome: Progressing     Problem: Skin Integrity  Goal: Skin integrity is maintained or improved  Outcome: Progressing   The patient is Watcher - Medium risk of patient condition declining or  worsening    Shift Goals  Clinical Goals: Return to baseline neuro status  Patient Goals: discharge  Family Goals: LOAN    Progress made toward(s) clinical / shift goals:      Patient is not progressing towards the following goals:
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
## 2023-10-23 NOTE — ASSESSMENT & PLAN NOTE
Prominence of patellar region, possible fracture with concurrent effusion  No clear ligamentous injuries  CT Knee shows effusion, negative for fracture   PT/OT

## 2023-10-23 NOTE — DISCHARGE PLANNING
Received Stipulation and Order from the court continuing pt's legal hold until 10/26, scanned copy into pt's chart.

## 2023-10-23 NOTE — DIETARY
"Nutrition services: Day 7 of admit.  Colt Segovia is a 48 y.o. male with admitting DX of alcohol dependence with delirium   Consult received for LOS with variable PO intake     RD met with pt at bedside, pt appears nourished. Pt states he was able to eat 100% of breakfast this morning and that his appetite is improving. Pt reports alcohol consumption pta varies but usually and 3-4 beers per day plus shots of captain phi. Pt tearful during interview and was filling out divorce paperwork. Pt did not have any specific nutrition related questions / concerns. RD expects PO intake to improve now that pt is no longer experiencing alcohol withdrawal.    Assessment:  Height: 188 cm (6' 2.02\")  Weight: 101 kg (221 lb 12.5 oz)  Body mass index is 28.46 kg/m²., BMI classification: overweight   Diet order: Regular  PO intake: 25-50% x2 meals, <25% x3 meals, 0% x1 meal, 50-75% x1 meal    Evaluation:   \"Mental status and clarity are improving yet again\" per MD  Labs: reviewed   Medications: Librium, Lexapro, Xarelto, thiamine, Ativan (PRN), Zofran (PRN)   GI: WDL, LBM 10/21/23 per flowsheet   Fluid accumulation: +1 edema of R LE per MD note     Malnutrition Risk: criteria not met     Recommendations/Plan:  Continue current diet order, Regular    Encourage intake of > 50% meals  Document intake of all meals  as % taken in ADL's to provide interdisciplinary communication across all shifts.   Monitor weight.  Consider social work / psych consult to address depression   Nutrition rep will continue to see patient for ongoing meal and snack preferences.     RD following        "

## 2023-10-24 PROCEDURE — 97116 GAIT TRAINING THERAPY: CPT

## 2023-10-24 PROCEDURE — 700102 HCHG RX REV CODE 250 W/ 637 OVERRIDE(OP)

## 2023-10-24 PROCEDURE — 700102 HCHG RX REV CODE 250 W/ 637 OVERRIDE(OP): Performed by: INTERNAL MEDICINE

## 2023-10-24 PROCEDURE — 99233 SBSQ HOSP IP/OBS HIGH 50: CPT | Performed by: INTERNAL MEDICINE

## 2023-10-24 PROCEDURE — 97165 OT EVAL LOW COMPLEX 30 MIN: CPT

## 2023-10-24 PROCEDURE — 770001 HCHG ROOM/CARE - MED/SURG/GYN PRIV*

## 2023-10-24 PROCEDURE — A9270 NON-COVERED ITEM OR SERVICE: HCPCS

## 2023-10-24 PROCEDURE — 700102 HCHG RX REV CODE 250 W/ 637 OVERRIDE(OP): Performed by: STUDENT IN AN ORGANIZED HEALTH CARE EDUCATION/TRAINING PROGRAM

## 2023-10-24 PROCEDURE — 99233 SBSQ HOSP IP/OBS HIGH 50: CPT | Mod: GC | Performed by: PSYCHIATRY & NEUROLOGY

## 2023-10-24 PROCEDURE — 97535 SELF CARE MNGMENT TRAINING: CPT

## 2023-10-24 PROCEDURE — 700102 HCHG RX REV CODE 250 W/ 637 OVERRIDE(OP): Performed by: HOSPITALIST

## 2023-10-24 PROCEDURE — A9270 NON-COVERED ITEM OR SERVICE: HCPCS | Performed by: INTERNAL MEDICINE

## 2023-10-24 PROCEDURE — A9270 NON-COVERED ITEM OR SERVICE: HCPCS | Performed by: STUDENT IN AN ORGANIZED HEALTH CARE EDUCATION/TRAINING PROGRAM

## 2023-10-24 PROCEDURE — A9270 NON-COVERED ITEM OR SERVICE: HCPCS | Performed by: HOSPITALIST

## 2023-10-24 RX ADMIN — FOLIC ACID 1 MG: 1 TABLET ORAL at 04:41

## 2023-10-24 RX ADMIN — Medication 100 MG: at 04:42

## 2023-10-24 RX ADMIN — ESCITALOPRAM OXALATE 20 MG: 10 TABLET ORAL at 04:42

## 2023-10-24 RX ADMIN — VERAPAMIL HYDROCHLORIDE 240 MG: 240 TABLET, FILM COATED, EXTENDED RELEASE ORAL at 04:42

## 2023-10-24 RX ADMIN — IBUPROFEN 400 MG: 400 TABLET, FILM COATED ORAL at 21:33

## 2023-10-24 RX ADMIN — IBUPROFEN 400 MG: 400 TABLET, FILM COATED ORAL at 04:42

## 2023-10-24 RX ADMIN — CHLORDIAZEPOXIDE HYDROCHLORIDE 25 MG: 25 CAPSULE ORAL at 04:42

## 2023-10-24 RX ADMIN — RIVAROXABAN 10 MG: 10 TABLET, FILM COATED ORAL at 16:25

## 2023-10-24 RX ADMIN — NALTREXONE HYDROCHLORIDE 50 MG: 50 TABLET, FILM COATED ORAL at 04:42

## 2023-10-24 RX ADMIN — CHLORDIAZEPOXIDE HYDROCHLORIDE 25 MG: 25 CAPSULE ORAL at 16:25

## 2023-10-24 ASSESSMENT — PATIENT HEALTH QUESTIONNAIRE - PHQ9
2. FEELING DOWN, DEPRESSED, IRRITABLE, OR HOPELESS: NOT AT ALL
SUM OF ALL RESPONSES TO PHQ9 QUESTIONS 1 AND 2: 0
1. LITTLE INTEREST OR PLEASURE IN DOING THINGS: NOT AT ALL

## 2023-10-24 ASSESSMENT — GAIT ASSESSMENTS
DISTANCE (FEET): 200
GAIT LEVEL OF ASSIST: CONTACT GUARD ASSIST
ASSISTIVE DEVICE: FRONT WHEEL WALKER
DEVIATION: ANTALGIC;DECREASED TOE OFF

## 2023-10-24 ASSESSMENT — COGNITIVE AND FUNCTIONAL STATUS - GENERAL
TURNING FROM BACK TO SIDE WHILE IN FLAT BAD: A LOT
WALKING IN HOSPITAL ROOM: A LITTLE
DAILY ACTIVITIY SCORE: 17
TOILETING: A LITTLE
DRESSING REGULAR UPPER BODY CLOTHING: A LOT
PERSONAL GROOMING: A LITTLE
CLIMB 3 TO 5 STEPS WITH RAILING: A LOT
STANDING UP FROM CHAIR USING ARMS: A LITTLE
MOVING TO AND FROM BED TO CHAIR: A LOT
HELP NEEDED FOR BATHING: A LITTLE
DRESSING REGULAR LOWER BODY CLOTHING: A LITTLE
SUGGESTED CMS G CODE MODIFIER MOBILITY: CK
MOBILITY SCORE: 15
EATING MEALS: A LITTLE
SUGGESTED CMS G CODE MODIFIER DAILY ACTIVITY: CK
MOVING FROM LYING ON BACK TO SITTING ON SIDE OF FLAT BED: A LITTLE

## 2023-10-24 ASSESSMENT — ENCOUNTER SYMPTOMS
GASTROINTESTINAL NEGATIVE: 1
NAUSEA: 0
VOMITING: 0
EYES NEGATIVE: 1
RESPIRATORY NEGATIVE: 1
CARDIOVASCULAR NEGATIVE: 1
CHILLS: 0
NEUROLOGICAL NEGATIVE: 1
FEVER: 0
CONSTITUTIONAL NEGATIVE: 1
BACK PAIN: 0
ABDOMINAL PAIN: 0
DEPRESSION: 1
MYALGIAS: 1

## 2023-10-24 ASSESSMENT — ACTIVITIES OF DAILY LIVING (ADL): TOILETING: INDEPENDENT

## 2023-10-24 ASSESSMENT — LIFESTYLE VARIABLES: SUBSTANCE_ABUSE: 1

## 2023-10-24 NOTE — CARE PLAN
Problem: Knowledge Deficit - Standard  Goal: Patient and family/care givers will demonstrate understanding of plan of care, disease process/condition, diagnostic tests and medications  Note: Education provided regarding alcohol withdrawal including signs and symptoms, kept on 1:1 sitter.    The patient is Stable - Low risk of patient condition declining or worsening    Shift Goals  Clinical Goals: pain management  Patient Goals: safety and pain management  Family Goals: que    Progress made toward(s) clinical / shift goals:      Patient is not progressing towards the following goals:
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
## 2023-10-24 NOTE — DISCHARGE PLANNING
Case Management Discharge Planning    Admission Date: 10/15/2023  GMLOS: 3.4  ALOS: 8    6-Clicks ADL Score: 14  6-Clicks Mobility Score: 9  PT and/or OT Eval ordered: Yes  Post-acute Referrals Ordered: Yes  Post-acute Choice Obtained: Yes  Has referral(s) been sent to post-acute provider:  Yes      Anticipated Discharge Dispo: Discharge Disposition: Still a Patient (30)    DME Needed: No    Action(s) Taken: Updated Provider/Nurse on Discharge Plan    Escalations Completed: None    Medically Clear: No    Next Steps: awaiting Legal Hold disposition    Barriers to Discharge: Medical clearance and Pending Placement    Is the patient up for discharge tomorrow: No    Waiting to see when Psych hold will be clear etc further escalate the plan for placement.

## 2023-10-24 NOTE — CARE PLAN
Pt Aox4, more alert and lucid today than last night. Only sign of possible confusion is that pt asked if it was still definitively the plan that they were discharging tomorrow. Pt c/o R knee pain that was helped with PRN ibuprofen. Pt ambulated to the bathroom with 2x assist.    Problem: Safety - Violent/Self-destructive Restraint  Goal: Remains free of injury from restraints (Restraint for Violent/Self-Destructive Behavior)  Outcome: Progressing  Goal: Free from restraints (Restraint for Violent or Self-Destructive Behavior)  Outcome: Progressing     Problem: Pain - Standard  Goal: Alleviation of pain or a reduction in pain to the patient’s comfort goal  Outcome: Progressing     Problem: Knowledge Deficit - Standard  Goal: Patient and family/care givers will demonstrate understanding of plan of care, disease process/condition, diagnostic tests and medications  Outcome: Progressing     Problem: Optimal Care for Alcohol Withdrawal  Goal: Optimal Care for the alcohol withdrawal patient  Outcome: Progressing     Problem: Seizure Precautions  Goal: Implementation of seizure precautions  Outcome: Progressing     Problem: Lifestyle Changes  Goal: Patient's ability to identify lifestyle changes and available resources to help reduce recurrence of condition will improve  Outcome: Progressing     Problem: Psychosocial  Goal: Patient's level of anxiety will decrease  Outcome: Progressing  Goal: Spiritual and cultural needs incorporated into hospitalization  Outcome: Progressing     Problem: Risk for Aspiration  Goal: Patient's risk for aspiration will be absent or decrease  Outcome: Progressing     Problem: Psychosocial  Goal: Patient's ability to identify and develop effective coping behaviors will improve  Outcome: Progressing  Goal: Patient's ability to identify and utilize available support systems will improve  Outcome: Progressing     Problem: Fall Risk  Goal: Patient will remain free from falls  Outcome: Progressing      Problem: Safety - Medical Restraint  Goal: Remains free of injury from restraints (Restraint for Interference with Medical Device)  Outcome: Progressing  Goal: Free from restraint(s) (Restraint for Interference with Medical Device)  Outcome: Progressing     Problem: Skin Integrity  Goal: Skin integrity is maintained or improved  Outcome: Progressing   The patient is Stable - Low risk of patient condition declining or worsening    Shift Goals  Clinical Goals: Reduce R knee swelling  Patient Goals: DC  Family Goals: LOAN    Progress made toward(s) clinical / shift goals:      Patient is not progressing towards the following goals:

## 2023-10-24 NOTE — DISCHARGE PLANNING
KANIKA Leal received a referral request from [facility] [place], request for additional substance abuse resources .In chart review pt's plan at discharge is pending placement and on a legal hold. [facility] will not follow at this time per [facility] protocol.

## 2023-10-24 NOTE — DISCHARGE PLANNING
Legal Hold    Referral: Legal Hold Court     Intervention: Pt presented for legal hold meeting with  via video conferencing.  advised pt will meet with court MD's via telemedicine monitor to contest the legal hold.      Plan: Pt will present to telemedicine mental health to meet with court physicians 10/25. Will call bedside RN once time has been determined.
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
## 2023-10-25 VITALS
OXYGEN SATURATION: 94 % | HEART RATE: 76 BPM | SYSTOLIC BLOOD PRESSURE: 169 MMHG | BODY MASS INDEX: 28.46 KG/M2 | RESPIRATION RATE: 17 BRPM | WEIGHT: 221.78 LBS | TEMPERATURE: 96.9 F | HEIGHT: 74 IN | DIASTOLIC BLOOD PRESSURE: 109 MMHG

## 2023-10-25 PROCEDURE — 700102 HCHG RX REV CODE 250 W/ 637 OVERRIDE(OP): Performed by: INTERNAL MEDICINE

## 2023-10-25 PROCEDURE — 99239 HOSP IP/OBS DSCHRG MGMT >30: CPT | Performed by: INTERNAL MEDICINE

## 2023-10-25 PROCEDURE — 700102 HCHG RX REV CODE 250 W/ 637 OVERRIDE(OP): Performed by: STUDENT IN AN ORGANIZED HEALTH CARE EDUCATION/TRAINING PROGRAM

## 2023-10-25 PROCEDURE — 99231 SBSQ HOSP IP/OBS SF/LOW 25: CPT | Mod: GC | Performed by: PSYCHIATRY & NEUROLOGY

## 2023-10-25 PROCEDURE — A9270 NON-COVERED ITEM OR SERVICE: HCPCS | Performed by: STUDENT IN AN ORGANIZED HEALTH CARE EDUCATION/TRAINING PROGRAM

## 2023-10-25 PROCEDURE — A9270 NON-COVERED ITEM OR SERVICE: HCPCS | Performed by: INTERNAL MEDICINE

## 2023-10-25 RX ADMIN — Medication 100 MG: at 05:58

## 2023-10-25 RX ADMIN — FOLIC ACID 1 MG: 1 TABLET ORAL at 05:58

## 2023-10-25 RX ADMIN — ESCITALOPRAM OXALATE 20 MG: 10 TABLET ORAL at 05:57

## 2023-10-25 RX ADMIN — CHLORDIAZEPOXIDE HYDROCHLORIDE 25 MG: 25 CAPSULE ORAL at 05:58

## 2023-10-25 RX ADMIN — NALTREXONE HYDROCHLORIDE 50 MG: 50 TABLET, FILM COATED ORAL at 05:57

## 2023-10-25 RX ADMIN — VERAPAMIL HYDROCHLORIDE 240 MG: 240 TABLET, FILM COATED, EXTENDED RELEASE ORAL at 05:57

## 2023-10-25 ASSESSMENT — PAIN DESCRIPTION - PAIN TYPE: TYPE: ACUTE PAIN

## 2023-10-25 ASSESSMENT — PATIENT HEALTH QUESTIONNAIRE - PHQ9
2. FEELING DOWN, DEPRESSED, IRRITABLE, OR HOPELESS: NOT AT ALL
1. LITTLE INTEREST OR PLEASURE IN DOING THINGS: NOT AT ALL
SUM OF ALL RESPONSES TO PHQ9 QUESTIONS 1 AND 2: 0
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
## 2023-10-25 NOTE — DISCHARGE PLANNING
Spoke to  Levar regarding patient's meeting with the court doctors today. Patient has been scheduled to meet with court doctors via telemedicine in the [address] room at 1105. Notified Charge VIMAL Crane and bedside VIMAL Morales of upcoming meeting this morning.

## 2023-10-25 NOTE — CARE PLAN
Pt remains Aox4, pleasant. R knee swelling decreased and pt endorses better ambulation.     The patient is Stable - Low risk of patient condition declining or worsening    Shift Goals  Clinical Goals: Remain AOx4  Patient Goals: safety and pain management  Family Goals: que    Progress made toward(s) clinical / shift goals:      Patient is not progressing towards the following goals:

## 2023-10-25 NOTE — PROGRESS NOTES
Late note: Patient left against legal hold today (releases tomrro1 10/26) at approx 1300. Patient informed that he must remain in assigned room within view of 1:1 sitter until legal hold is reviewed tomorrow 10/26, despite his psych court team clearing him of SI and hospitalist service medically clearing him.     Pt said that he understood he will be leaving against medical hold and he does not wish to be an inpatient any longer. Patient took all his belongings with him and left AMA. Charge RN, MD, US notified.
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
## 2024-03-20 NOTE — DISCHARGE PLANNING
Called and LM for patient to call back. Please relay results below.   Jessica Ortiz MA     Your kidney function, liver enzymes and electrolytes were normal.  Your cholesterol looks good with the LDL or the bad cholesterol of 48.  The goal for your LDL to be less than 160.  I however recommend to continue with low-dose Lipitor for cardiac protection from your other medical conditions.  Your diabetes is controlled with a hemoglobin A1c of 6.4.  The goal for your hemoglobin A1c to be less than 7.0.  No change in medication.  Your uric acid level was normal.  You may stop the allopurinol if you wish.  Let me know if you need further question.  Thank you     [place].   Received consult from provider to see pt and his mother at bedside to discuss insurance questions and provide substance abuse resources  Met with them today and discussed above  Made request PFA to screen for possible Medicaid, also discussed they may need to [employer] insurance  Provided general substance abuse resources for [facility], also communicated with Community , Mel, and requested for her to see pt to provide additional resources if appropriate

## 2025-06-09 NOTE — THERAPY
Physical Therapy Contact Note    Patient Name: Diego Eighty-Nine  Age:  48 y.o., Sex:  male  Medical Record #: [id number]  Today's Date: 10/18/2023           10/18/23 1500   Interdisciplinary Plan of Care Collaboration   Collaboration Comments PT orders received and chart review completed. Patient currently withdrawing with increased sedation needs. Will follow up as appropriate        09-Jun-2025 00:00